# Patient Record
Sex: MALE | Race: WHITE | NOT HISPANIC OR LATINO | Employment: OTHER | URBAN - METROPOLITAN AREA
[De-identification: names, ages, dates, MRNs, and addresses within clinical notes are randomized per-mention and may not be internally consistent; named-entity substitution may affect disease eponyms.]

---

## 2017-01-09 ENCOUNTER — OFFICE VISIT (OUTPATIENT)
Dept: WOUND CARE | Facility: HOSPITAL | Age: 62
End: 2017-01-09
Attending: SURGERY
Payer: OTHER MISCELLANEOUS

## 2017-01-09 VITALS
RESPIRATION RATE: 20 BRPM | SYSTOLIC BLOOD PRESSURE: 92 MMHG | HEART RATE: 55 BPM | TEMPERATURE: 96 F | DIASTOLIC BLOOD PRESSURE: 61 MMHG

## 2017-01-09 DIAGNOSIS — L89.312 STAGE II PRESSURE ULCER OF RIGHT BUTTOCK: ICD-10-CM

## 2017-01-09 DIAGNOSIS — L89.324 DECUBITUS ULCER OF LEFT ISCHIUM, STAGE 4: Primary | ICD-10-CM

## 2017-01-09 PROCEDURE — 27201912 HC WOUND CARE DEBRIDEMENT SUPPLIES

## 2017-01-09 PROCEDURE — 97597 DBRDMT OPN WND 1ST 20 CM/<: CPT

## 2017-01-09 PROCEDURE — 11042 DBRDMT SUBQ TIS 1ST 20SQCM/<: CPT

## 2017-01-16 ENCOUNTER — OFFICE VISIT (OUTPATIENT)
Dept: WOUND CARE | Facility: HOSPITAL | Age: 62
End: 2017-01-16
Attending: SURGERY
Payer: OTHER MISCELLANEOUS

## 2017-01-16 VITALS
TEMPERATURE: 97 F | HEART RATE: 62 BPM | RESPIRATION RATE: 18 BRPM | SYSTOLIC BLOOD PRESSURE: 115 MMHG | DIASTOLIC BLOOD PRESSURE: 86 MMHG

## 2017-01-16 DIAGNOSIS — F17.210 CIGARETTE SMOKER: ICD-10-CM

## 2017-01-16 DIAGNOSIS — L89.312 STAGE II PRESSURE ULCER OF RIGHT BUTTOCK: ICD-10-CM

## 2017-01-16 DIAGNOSIS — L89.324 DECUBITUS ULCER OF LEFT ISCHIUM, STAGE 4: Primary | ICD-10-CM

## 2017-01-16 DIAGNOSIS — G82.20 PARAPLEGIA: ICD-10-CM

## 2017-01-16 DIAGNOSIS — Z86.718 HISTORY OF DEEP VENOUS THROMBOSIS: ICD-10-CM

## 2017-01-16 PROCEDURE — 11042 DBRDMT SUBQ TIS 1ST 20SQCM/<: CPT

## 2017-01-16 PROCEDURE — 27201912 HC WOUND CARE DEBRIDEMENT SUPPLIES

## 2017-01-16 NOTE — PROGRESS NOTES
The documentation for this encounter was completed in [employer].Please see the scanned in documents for the details.    Manny Vann MD

## 2017-01-23 ENCOUNTER — OFFICE VISIT (OUTPATIENT)
Dept: WOUND CARE | Facility: HOSPITAL | Age: 62
End: 2017-01-23
Attending: SURGERY
Payer: OTHER MISCELLANEOUS

## 2017-01-23 VITALS
TEMPERATURE: 98 F | SYSTOLIC BLOOD PRESSURE: 113 MMHG | DIASTOLIC BLOOD PRESSURE: 51 MMHG | HEART RATE: 55 BPM | RESPIRATION RATE: 20 BRPM

## 2017-01-23 DIAGNOSIS — G82.20 PARAPLEGIA: ICD-10-CM

## 2017-01-23 DIAGNOSIS — L89.324 DECUBITUS ULCER OF LEFT ISCHIUM, STAGE 4: Primary | ICD-10-CM

## 2017-01-23 DIAGNOSIS — F17.210 CIGARETTE SMOKER: ICD-10-CM

## 2017-01-23 DIAGNOSIS — L89.312 STAGE II PRESSURE ULCER OF RIGHT BUTTOCK: ICD-10-CM

## 2017-01-23 PROCEDURE — 27201912 HC WOUND CARE DEBRIDEMENT SUPPLIES

## 2017-01-23 PROCEDURE — 11042 DBRDMT SUBQ TIS 1ST 20SQCM/<: CPT

## 2017-01-23 PROCEDURE — 97597 DBRDMT OPN WND 1ST 20 CM/<: CPT

## 2017-01-30 ENCOUNTER — OFFICE VISIT (OUTPATIENT)
Dept: WOUND CARE | Facility: HOSPITAL | Age: 62
End: 2017-01-30
Attending: SURGERY
Payer: OTHER MISCELLANEOUS

## 2017-01-30 VITALS
TEMPERATURE: 97 F | DIASTOLIC BLOOD PRESSURE: 64 MMHG | SYSTOLIC BLOOD PRESSURE: 105 MMHG | RESPIRATION RATE: 20 BRPM | HEART RATE: 57 BPM

## 2017-01-30 DIAGNOSIS — Z86.718 HISTORY OF DEEP VENOUS THROMBOSIS: ICD-10-CM

## 2017-01-30 DIAGNOSIS — F17.210 CIGARETTE SMOKER: ICD-10-CM

## 2017-01-30 DIAGNOSIS — L89.312 STAGE II PRESSURE ULCER OF RIGHT BUTTOCK: ICD-10-CM

## 2017-01-30 DIAGNOSIS — L89.324 DECUBITUS ULCER OF LEFT ISCHIUM, STAGE 4: Primary | ICD-10-CM

## 2017-01-30 DIAGNOSIS — G82.20 PARAPLEGIA: ICD-10-CM

## 2017-01-30 PROCEDURE — 97597 DBRDMT OPN WND 1ST 20 CM/<: CPT

## 2017-01-30 PROCEDURE — 11042 DBRDMT SUBQ TIS 1ST 20SQCM/<: CPT

## 2017-01-30 PROCEDURE — 27201912 HC WOUND CARE DEBRIDEMENT SUPPLIES

## 2017-02-06 ENCOUNTER — OFFICE VISIT (OUTPATIENT)
Dept: WOUND CARE | Facility: HOSPITAL | Age: 62
End: 2017-02-06
Attending: SURGERY
Payer: OTHER MISCELLANEOUS

## 2017-02-06 VITALS
SYSTOLIC BLOOD PRESSURE: 139 MMHG | HEART RATE: 58 BPM | DIASTOLIC BLOOD PRESSURE: 68 MMHG | RESPIRATION RATE: 20 BRPM | TEMPERATURE: 97 F

## 2017-02-06 DIAGNOSIS — L89.312 STAGE II PRESSURE ULCER OF RIGHT BUTTOCK: ICD-10-CM

## 2017-02-06 DIAGNOSIS — G82.20 PARAPLEGIA: ICD-10-CM

## 2017-02-06 DIAGNOSIS — F17.210 CIGARETTE SMOKER: ICD-10-CM

## 2017-02-06 DIAGNOSIS — L89.324 DECUBITUS ULCER OF LEFT ISCHIUM, STAGE 4: Primary | ICD-10-CM

## 2017-02-06 PROCEDURE — 27201912 HC WOUND CARE DEBRIDEMENT SUPPLIES

## 2017-02-06 PROCEDURE — 11042 DBRDMT SUBQ TIS 1ST 20SQCM/<: CPT

## 2017-02-06 PROCEDURE — 97597 DBRDMT OPN WND 1ST 20 CM/<: CPT

## 2017-02-13 ENCOUNTER — OFFICE VISIT (OUTPATIENT)
Dept: WOUND CARE | Facility: HOSPITAL | Age: 62
End: 2017-02-13
Attending: SURGERY
Payer: OTHER MISCELLANEOUS

## 2017-02-13 VITALS
DIASTOLIC BLOOD PRESSURE: 70 MMHG | RESPIRATION RATE: 20 BRPM | SYSTOLIC BLOOD PRESSURE: 153 MMHG | HEART RATE: 60 BPM | TEMPERATURE: 98 F

## 2017-02-13 DIAGNOSIS — G82.20 PARAPLEGIA: ICD-10-CM

## 2017-02-13 DIAGNOSIS — L89.312 STAGE II PRESSURE ULCER OF RIGHT BUTTOCK: ICD-10-CM

## 2017-02-13 DIAGNOSIS — F17.210 CIGARETTE SMOKER: ICD-10-CM

## 2017-02-13 DIAGNOSIS — L89.324 DECUBITUS ULCER OF LEFT ISCHIUM, STAGE 4: Primary | ICD-10-CM

## 2017-02-13 DIAGNOSIS — L97.312 ULCER OF RIGHT ANKLE, WITH FAT LAYER EXPOSED: ICD-10-CM

## 2017-02-13 DIAGNOSIS — Z86.718 HISTORY OF DEEP VENOUS THROMBOSIS: ICD-10-CM

## 2017-02-13 PROCEDURE — 97597 DBRDMT OPN WND 1ST 20 CM/<: CPT

## 2017-02-13 PROCEDURE — 27201912 HC WOUND CARE DEBRIDEMENT SUPPLIES

## 2017-02-13 PROCEDURE — 11042 DBRDMT SUBQ TIS 1ST 20SQCM/<: CPT

## 2017-02-20 ENCOUNTER — OFFICE VISIT (OUTPATIENT)
Dept: WOUND CARE | Facility: HOSPITAL | Age: 62
End: 2017-02-20
Attending: SURGERY
Payer: OTHER MISCELLANEOUS

## 2017-02-20 ENCOUNTER — CLINICAL SUPPORT (OUTPATIENT)
Dept: SMOKING CESSATION | Facility: CLINIC | Age: 62
End: 2017-02-20
Payer: COMMERCIAL

## 2017-02-20 VITALS
TEMPERATURE: 98 F | DIASTOLIC BLOOD PRESSURE: 62 MMHG | RESPIRATION RATE: 20 BRPM | HEART RATE: 73 BPM | SYSTOLIC BLOOD PRESSURE: 89 MMHG

## 2017-02-20 DIAGNOSIS — L89.513 STAGE III PRESSURE ULCER OF RIGHT ANKLE: ICD-10-CM

## 2017-02-20 DIAGNOSIS — L89.324 DECUBITUS ULCER OF LEFT ISCHIUM, STAGE 4: Primary | ICD-10-CM

## 2017-02-20 DIAGNOSIS — F17.210 CIGARETTE SMOKER: ICD-10-CM

## 2017-02-20 DIAGNOSIS — F17.200 NICOTINE DEPENDENCE: Primary | ICD-10-CM

## 2017-02-20 DIAGNOSIS — L89.312 STAGE II PRESSURE ULCER OF RIGHT BUTTOCK: ICD-10-CM

## 2017-02-20 PROCEDURE — 97597 DBRDMT OPN WND 1ST 20 CM/<: CPT

## 2017-02-20 PROCEDURE — 99404 PREV MED CNSL INDIV APPRX 60: CPT | Mod: S$GLB,,,

## 2017-02-20 PROCEDURE — 27201912 HC WOUND CARE DEBRIDEMENT SUPPLIES

## 2017-02-20 PROCEDURE — 11042 DBRDMT SUBQ TIS 1ST 20SQCM/<: CPT

## 2017-02-20 RX ORDER — VARENICLINE TARTRATE 0.5 (11)-1
KIT ORAL
Qty: 1 PACKAGE | Refills: 0 | Status: SHIPPED | OUTPATIENT
Start: 2017-02-20 | End: 2017-03-20

## 2017-02-20 RX ORDER — IBUPROFEN 200 MG
1 TABLET ORAL DAILY
Qty: 14 PATCH | Refills: 0 | Status: SHIPPED | OUTPATIENT
Start: 2017-02-20 | End: 2017-03-06

## 2017-02-20 NOTE — LETTER
February 20, 2017      Manny Vann MD  [address] [facility] [address]           [facility] [address] [place] [address]  Phone: [phone]  Fax: [phone]          Patient: Kasia Vizcaino   MR Number: [id number]   YOB: 1955   Date of Visit: 2/20/2017       Dear Dr. Manny Vann:    Thank you for referring Kasia Vizcaino to me for evaluation. Attached you will find relevant portions of my assessment and plan of care.    If you have questions, please do not hesitate to call me. I look forward to following Kasia Vizcaino along with you.    Sincerely,    Leticia Marquez RN    Enclosure  CC:  No Recipients    If you would like to receive this communication electronically, please contact [email or web address] or [phone] to request more information on [employer] Link access.    For providers and/or their staff who would like to refer a patient to [facility], please contact us through our one-stop-shop provider referral line, [facility], at [phone].    If you feel you have received this communication in error or would no longer like to receive these types of communications, please e-mail [email or web address]

## 2017-03-06 ENCOUNTER — OFFICE VISIT (OUTPATIENT)
Dept: WOUND CARE | Facility: HOSPITAL | Age: 62
End: 2017-03-06
Attending: SURGERY
Payer: OTHER MISCELLANEOUS

## 2017-03-06 ENCOUNTER — TELEPHONE (OUTPATIENT)
Dept: SMOKING CESSATION | Facility: CLINIC | Age: 62
End: 2017-03-06

## 2017-03-06 VITALS
DIASTOLIC BLOOD PRESSURE: 67 MMHG | HEART RATE: 64 BPM | RESPIRATION RATE: 20 BRPM | SYSTOLIC BLOOD PRESSURE: 115 MMHG | TEMPERATURE: 98 F

## 2017-03-06 DIAGNOSIS — L89.513 STAGE III PRESSURE ULCER OF RIGHT ANKLE: ICD-10-CM

## 2017-03-06 DIAGNOSIS — L89.312 STAGE II PRESSURE ULCER OF RIGHT BUTTOCK: ICD-10-CM

## 2017-03-06 DIAGNOSIS — L97.312 ULCER OF RIGHT ANKLE, WITH FAT LAYER EXPOSED: ICD-10-CM

## 2017-03-06 DIAGNOSIS — L89.324 DECUBITUS ULCER OF LEFT ISCHIUM, STAGE 4: Primary | ICD-10-CM

## 2017-03-06 DIAGNOSIS — F17.210 CIGARETTE SMOKER: ICD-10-CM

## 2017-03-06 PROCEDURE — 27201912 HC WOUND CARE DEBRIDEMENT SUPPLIES

## 2017-03-06 PROCEDURE — 97597 DBRDMT OPN WND 1ST 20 CM/<: CPT

## 2017-03-06 PROCEDURE — 11042 DBRDMT SUBQ TIS 1ST 20SQCM/<: CPT

## 2017-03-09 ENCOUNTER — TELEPHONE (OUTPATIENT)
Dept: SMOKING CESSATION | Facility: CLINIC | Age: 62
End: 2017-03-09

## 2017-03-13 ENCOUNTER — OFFICE VISIT (OUTPATIENT)
Dept: WOUND CARE | Facility: HOSPITAL | Age: 62
End: 2017-03-13
Attending: SURGERY
Payer: OTHER MISCELLANEOUS

## 2017-03-13 DIAGNOSIS — L89.324 DECUBITUS ULCER OF LEFT ISCHIUM, STAGE 4: Primary | ICD-10-CM

## 2017-03-13 DIAGNOSIS — L89.513 STAGE III PRESSURE ULCER OF RIGHT ANKLE: ICD-10-CM

## 2017-03-13 DIAGNOSIS — G82.20 PARAPLEGIA: ICD-10-CM

## 2017-03-13 DIAGNOSIS — F17.210 CIGARETTE SMOKER: ICD-10-CM

## 2017-03-13 DIAGNOSIS — L89.312 STAGE II PRESSURE ULCER OF RIGHT BUTTOCK: ICD-10-CM

## 2017-03-13 PROCEDURE — 97597 DBRDMT OPN WND 1ST 20 CM/<: CPT

## 2017-03-13 PROCEDURE — 11042 DBRDMT SUBQ TIS 1ST 20SQCM/<: CPT

## 2017-03-13 PROCEDURE — 27201912 HC WOUND CARE DEBRIDEMENT SUPPLIES

## 2017-03-14 ENCOUNTER — TELEPHONE (OUTPATIENT)
Dept: SMOKING CESSATION | Facility: CLINIC | Age: 62
End: 2017-03-14

## 2017-03-20 ENCOUNTER — OFFICE VISIT (OUTPATIENT)
Dept: WOUND CARE | Facility: HOSPITAL | Age: 62
End: 2017-03-20
Attending: SURGERY
Payer: COMMERCIAL

## 2017-03-20 VITALS — DIASTOLIC BLOOD PRESSURE: 79 MMHG | SYSTOLIC BLOOD PRESSURE: 167 MMHG | HEART RATE: 68 BPM | RESPIRATION RATE: 20 BRPM

## 2017-03-20 DIAGNOSIS — L97.312 ULCER OF RIGHT ANKLE, WITH FAT LAYER EXPOSED: ICD-10-CM

## 2017-03-20 DIAGNOSIS — L89.313 STAGE III PRESSURE ULCER OF RIGHT BUTTOCK: ICD-10-CM

## 2017-03-20 DIAGNOSIS — L89.513 STAGE III PRESSURE ULCER OF RIGHT ANKLE: ICD-10-CM

## 2017-03-20 DIAGNOSIS — G82.20 PARAPLEGIA: ICD-10-CM

## 2017-03-20 DIAGNOSIS — L89.324 DECUBITUS ULCER OF LEFT ISCHIUM, STAGE 4: Primary | ICD-10-CM

## 2017-03-20 DIAGNOSIS — F17.210 CIGARETTE SMOKER: ICD-10-CM

## 2017-03-20 PROCEDURE — 11042 DBRDMT SUBQ TIS 1ST 20SQCM/<: CPT

## 2017-03-20 PROCEDURE — 27201912 HC WOUND CARE DEBRIDEMENT SUPPLIES

## 2017-03-21 ENCOUNTER — TELEPHONE (OUTPATIENT)
Dept: SMOKING CESSATION | Facility: CLINIC | Age: 62
End: 2017-03-21

## 2017-03-21 NOTE — TELEPHONE ENCOUNTER
Left message for Mr. Pedroza to call back ([phone]) for phone follow up/medication review and to see if he was still interested in participating in the Smoking Cessation Program.

## 2017-03-22 ENCOUNTER — CLINICAL SUPPORT (OUTPATIENT)
Dept: SMOKING CESSATION | Facility: CLINIC | Age: 62
End: 2017-03-22
Payer: COMMERCIAL

## 2017-03-22 DIAGNOSIS — F17.200 NICOTINE DEPENDENCE: Primary | ICD-10-CM

## 2017-03-22 PROCEDURE — 99403 PREV MED CNSL INDIV APPRX 45: CPT | Mod: S$GLB,,,

## 2017-03-22 RX ORDER — VARENICLINE TARTRATE 1 MG/1
1 TABLET, FILM COATED ORAL 2 TIMES DAILY
Qty: 62 TABLET | Refills: 0 | Status: SHIPPED | OUTPATIENT
Start: 2017-03-22 | End: 2017-04-22

## 2017-03-22 NOTE — PROGRESS NOTES
Individual Follow-Up Form    3/22/2017    Planned Quit Date: 4/1/2017    Clinical Status of Patient: Outpatient    Length of Service: 45 minutes    Continuing Medication: yes  Chantix       Target Symptoms: Withdrawal and medication side effects. The following were  rated moderate (3) to severe (4) on TCRS:  · Moderate (3):  None   · Severe (4):  None     Comments:  Patient is smoking 6 cigarettes per day and remains on the prescribed tobacco cessation medication regimen of 1 mg Chantix BID without any negative side effects at this time. Orientation, completion of TCRS (Tobacco Cessation Rating Scale) learned addiction model, cues/triggers, personal reasons for quitting, medications, goals, quit date. Patient has a planned quit date of 4/1/2017.    Diagnosis: F17.200    Next Visit: 1 week

## 2017-03-27 ENCOUNTER — OFFICE VISIT (OUTPATIENT)
Dept: WOUND CARE | Facility: HOSPITAL | Age: 62
End: 2017-03-27
Attending: SURGERY
Payer: OTHER MISCELLANEOUS

## 2017-03-27 VITALS — RESPIRATION RATE: 20 BRPM | DIASTOLIC BLOOD PRESSURE: 80 MMHG | SYSTOLIC BLOOD PRESSURE: 165 MMHG | HEART RATE: 66 BPM

## 2017-03-27 DIAGNOSIS — L89.324 DECUBITUS ULCER OF LEFT ISCHIUM, STAGE 4: Primary | ICD-10-CM

## 2017-03-27 DIAGNOSIS — L89.513 STAGE III PRESSURE ULCER OF RIGHT ANKLE: ICD-10-CM

## 2017-03-27 DIAGNOSIS — F17.210 CIGARETTE SMOKER: ICD-10-CM

## 2017-03-27 DIAGNOSIS — G82.20 PARAPLEGIA: ICD-10-CM

## 2017-03-27 DIAGNOSIS — L89.313 STAGE III PRESSURE ULCER OF RIGHT BUTTOCK: ICD-10-CM

## 2017-03-27 PROCEDURE — 11042 DBRDMT SUBQ TIS 1ST 20SQCM/<: CPT

## 2017-03-27 PROCEDURE — 27201912 HC WOUND CARE DEBRIDEMENT SUPPLIES

## 2017-04-03 ENCOUNTER — OFFICE VISIT (OUTPATIENT)
Dept: WOUND CARE | Facility: HOSPITAL | Age: 62
End: 2017-04-03
Attending: SURGERY
Payer: OTHER MISCELLANEOUS

## 2017-04-03 VITALS
DIASTOLIC BLOOD PRESSURE: 60 MMHG | RESPIRATION RATE: 20 BRPM | SYSTOLIC BLOOD PRESSURE: 91 MMHG | HEART RATE: 74 BPM | TEMPERATURE: 97 F

## 2017-04-03 DIAGNOSIS — L89.513 STAGE III PRESSURE ULCER OF RIGHT ANKLE: ICD-10-CM

## 2017-04-03 DIAGNOSIS — F17.210 CIGARETTE SMOKER: ICD-10-CM

## 2017-04-03 DIAGNOSIS — L97.312 ULCER OF RIGHT ANKLE, WITH FAT LAYER EXPOSED: ICD-10-CM

## 2017-04-03 DIAGNOSIS — L89.324 DECUBITUS ULCER OF LEFT ISCHIUM, STAGE 4: Primary | ICD-10-CM

## 2017-04-03 DIAGNOSIS — G82.20 PARAPLEGIA: ICD-10-CM

## 2017-04-03 PROCEDURE — 27201912 HC WOUND CARE DEBRIDEMENT SUPPLIES

## 2017-04-03 PROCEDURE — 11042 DBRDMT SUBQ TIS 1ST 20SQCM/<: CPT

## 2017-04-10 ENCOUNTER — OFFICE VISIT (OUTPATIENT)
Dept: WOUND CARE | Facility: HOSPITAL | Age: 62
End: 2017-04-10
Attending: SURGERY
Payer: COMMERCIAL

## 2017-04-10 ENCOUNTER — TELEPHONE (OUTPATIENT)
Dept: SMOKING CESSATION | Facility: CLINIC | Age: 62
End: 2017-04-10

## 2017-04-10 VITALS — DIASTOLIC BLOOD PRESSURE: 64 MMHG | RESPIRATION RATE: 18 BRPM | HEART RATE: 67 BPM | SYSTOLIC BLOOD PRESSURE: 127 MMHG

## 2017-04-10 DIAGNOSIS — L89.313 STAGE III PRESSURE ULCER OF RIGHT BUTTOCK: ICD-10-CM

## 2017-04-10 DIAGNOSIS — L89.513 STAGE III PRESSURE ULCER OF RIGHT ANKLE: ICD-10-CM

## 2017-04-10 DIAGNOSIS — L89.324 DECUBITUS ULCER OF LEFT ISCHIUM, STAGE 4: Primary | ICD-10-CM

## 2017-04-10 DIAGNOSIS — F17.210 CIGARETTE SMOKER: ICD-10-CM

## 2017-04-10 DIAGNOSIS — G82.20 PARAPLEGIA: ICD-10-CM

## 2017-04-10 PROCEDURE — 27201912 HC WOUND CARE DEBRIDEMENT SUPPLIES

## 2017-04-10 PROCEDURE — 11042 DBRDMT SUBQ TIS 1ST 20SQCM/<: CPT

## 2017-04-10 NOTE — TELEPHONE ENCOUNTER
Attempted to call Mr. Pedroza for follow up/medication review and to see if he was still interested in participating in Smoking Cessation Program. Called wife (Ms. Liz) as second phone number, she stated that she was on her way home from work and let him know to give me a call back at  [phone].

## 2017-04-17 ENCOUNTER — CLINICAL SUPPORT (OUTPATIENT)
Dept: SMOKING CESSATION | Facility: CLINIC | Age: 62
End: 2017-04-17
Payer: COMMERCIAL

## 2017-04-17 DIAGNOSIS — F17.200 NICOTINE DEPENDENCE: Primary | ICD-10-CM

## 2017-04-17 PROCEDURE — 99407 BEHAV CHNG SMOKING > 10 MIN: CPT | Mod: S$GLB,,,

## 2017-04-17 RX ORDER — IBUPROFEN 200 MG
1 TABLET ORAL DAILY
Qty: 14 PATCH | Refills: 0 | Status: SHIPPED | OUTPATIENT
Start: 2017-04-17 | End: 2017-05-01

## 2017-04-24 ENCOUNTER — OFFICE VISIT (OUTPATIENT)
Dept: WOUND CARE | Facility: HOSPITAL | Age: 62
End: 2017-04-24
Attending: SURGERY
Payer: OTHER MISCELLANEOUS

## 2017-04-24 VITALS
SYSTOLIC BLOOD PRESSURE: 89 MMHG | DIASTOLIC BLOOD PRESSURE: 60 MMHG | TEMPERATURE: 98 F | HEART RATE: 68 BPM | RESPIRATION RATE: 18 BRPM

## 2017-04-24 DIAGNOSIS — F17.210 CIGARETTE SMOKER: ICD-10-CM

## 2017-04-24 DIAGNOSIS — G82.20 PARAPLEGIA: ICD-10-CM

## 2017-04-24 DIAGNOSIS — L89.324 DECUBITUS ULCER OF LEFT ISCHIUM, STAGE 4: Primary | ICD-10-CM

## 2017-04-24 DIAGNOSIS — L89.313 STAGE III PRESSURE ULCER OF RIGHT BUTTOCK: ICD-10-CM

## 2017-04-24 PROBLEM — L89.513 STAGE III PRESSURE ULCER OF RIGHT ANKLE: Status: RESOLVED | Noted: 2017-02-20 | Resolved: 2017-04-24

## 2017-04-24 PROCEDURE — 27201912 HC WOUND CARE DEBRIDEMENT SUPPLIES

## 2017-04-24 PROCEDURE — 11042 DBRDMT SUBQ TIS 1ST 20SQCM/<: CPT

## 2017-05-01 ENCOUNTER — OFFICE VISIT (OUTPATIENT)
Dept: WOUND CARE | Facility: HOSPITAL | Age: 62
End: 2017-05-01
Attending: SURGERY
Payer: OTHER MISCELLANEOUS

## 2017-05-01 VITALS — HEART RATE: 59 BPM | DIASTOLIC BLOOD PRESSURE: 63 MMHG | RESPIRATION RATE: 18 BRPM | SYSTOLIC BLOOD PRESSURE: 127 MMHG

## 2017-05-01 DIAGNOSIS — L89.324 DECUBITUS ULCER OF LEFT ISCHIUM, STAGE 4: Primary | ICD-10-CM

## 2017-05-01 DIAGNOSIS — L89.313 STAGE III PRESSURE ULCER OF RIGHT BUTTOCK: ICD-10-CM

## 2017-05-01 DIAGNOSIS — L89.513 PRESSURE ULCER OF RIGHT ANKLE, STAGE 3: ICD-10-CM

## 2017-05-01 DIAGNOSIS — F17.210 CIGARETTE SMOKER: ICD-10-CM

## 2017-05-01 PROCEDURE — 27201912 HC WOUND CARE DEBRIDEMENT SUPPLIES

## 2017-05-01 PROCEDURE — 11042 DBRDMT SUBQ TIS 1ST 20SQCM/<: CPT

## 2017-05-02 ENCOUNTER — CLINICAL SUPPORT (OUTPATIENT)
Dept: SMOKING CESSATION | Facility: CLINIC | Age: 62
End: 2017-05-02
Payer: COMMERCIAL

## 2017-05-02 DIAGNOSIS — F17.200 NICOTINE DEPENDENCE: Primary | ICD-10-CM

## 2017-05-02 PROCEDURE — 99403 PREV MED CNSL INDIV APPRX 45: CPT | Mod: S$GLB,,,

## 2017-05-02 NOTE — PROGRESS NOTES
Individual Follow-Up Form    5/2/2017      Clinical Status of Patient: Outpatient    Length of Service: 45 minutes    Continuing Medication: yes  Patches       Target Symptoms: Withdrawal and medication side effects. The following were rated moderate (3) to severe (4) on TCRS:  · Moderate (3):  None   · Severe (4):  None     Comments:  Patient is smoking 10 cigarettes per day and will start using the prescribed tobacco cessation medication regimen of 21 mg nicotine patches tomorrow 5/3/2017. Patient stated that he wanted to finish taking the Chantix before he switched to the nicotine patches. Completion of TCRS (Tobacco Cessation Rating Scale) reviewed strategies, controlling environment, cues, triggers, new goals set. Introduced high risk situations with preparation interventions, caffeine similarities with withdrawal issues of habit and nicotine, Alcohol, Understanding urges, cravings, stress and relaxation. Open discussion with intervention discussion. Patient's goal for his next visit scheduled on 5/16/2017 is to cut down by half (5 cigarettes per day).         Diagnosis: F17.200    Next Visit: 2 weeks

## 2017-05-22 ENCOUNTER — OFFICE VISIT (OUTPATIENT)
Dept: WOUND CARE | Facility: HOSPITAL | Age: 62
End: 2017-05-22
Attending: SURGERY
Payer: OTHER MISCELLANEOUS

## 2017-05-22 VITALS — HEART RATE: 62 BPM | RESPIRATION RATE: 18 BRPM | DIASTOLIC BLOOD PRESSURE: 73 MMHG | SYSTOLIC BLOOD PRESSURE: 138 MMHG

## 2017-05-22 DIAGNOSIS — L89.324 DECUBITUS ULCER OF LEFT ISCHIUM, STAGE 4: Primary | ICD-10-CM

## 2017-05-22 DIAGNOSIS — F17.210 CIGARETTE SMOKER: ICD-10-CM

## 2017-05-22 DIAGNOSIS — Z86.718 HISTORY OF DEEP VENOUS THROMBOSIS: ICD-10-CM

## 2017-05-22 DIAGNOSIS — G82.20 PARAPLEGIA: ICD-10-CM

## 2017-05-22 DIAGNOSIS — L89.313 STAGE III PRESSURE ULCER OF RIGHT BUTTOCK: ICD-10-CM

## 2017-05-22 PROCEDURE — 11042 DBRDMT SUBQ TIS 1ST 20SQCM/<: CPT

## 2017-05-22 PROCEDURE — 27201912 HC WOUND CARE DEBRIDEMENT SUPPLIES

## 2017-06-12 ENCOUNTER — OFFICE VISIT (OUTPATIENT)
Dept: WOUND CARE | Facility: HOSPITAL | Age: 62
End: 2017-06-12
Attending: SURGERY
Payer: OTHER MISCELLANEOUS

## 2017-06-12 VITALS
RESPIRATION RATE: 18 BRPM | DIASTOLIC BLOOD PRESSURE: 55 MMHG | HEART RATE: 72 BPM | SYSTOLIC BLOOD PRESSURE: 104 MMHG | TEMPERATURE: 98 F

## 2017-06-12 DIAGNOSIS — L89.324 DECUBITUS ULCER OF LEFT ISCHIUM, STAGE 4: Primary | ICD-10-CM

## 2017-06-12 DIAGNOSIS — G82.20 PARAPLEGIA: ICD-10-CM

## 2017-06-12 DIAGNOSIS — L89.313 STAGE III PRESSURE ULCER OF RIGHT BUTTOCK: ICD-10-CM

## 2017-06-12 DIAGNOSIS — F17.210 CIGARETTE SMOKER: ICD-10-CM

## 2017-06-12 PROBLEM — L89.513 PRESSURE ULCER OF RIGHT ANKLE, STAGE 3: Status: RESOLVED | Noted: 2017-05-01 | Resolved: 2017-06-12

## 2017-06-12 PROCEDURE — 27201912 HC WOUND CARE DEBRIDEMENT SUPPLIES

## 2017-06-12 PROCEDURE — 11042 DBRDMT SUBQ TIS 1ST 20SQCM/<: CPT

## 2017-06-26 ENCOUNTER — OFFICE VISIT (OUTPATIENT)
Dept: WOUND CARE | Facility: HOSPITAL | Age: 62
End: 2017-06-26
Attending: SURGERY
Payer: COMMERCIAL

## 2017-06-26 VITALS — HEART RATE: 63 BPM | DIASTOLIC BLOOD PRESSURE: 67 MMHG | SYSTOLIC BLOOD PRESSURE: 114 MMHG | RESPIRATION RATE: 18 BRPM

## 2017-06-26 DIAGNOSIS — L89.324 DECUBITUS ULCER OF LEFT ISCHIUM, STAGE 4: Primary | ICD-10-CM

## 2017-06-26 DIAGNOSIS — L89.313 STAGE III PRESSURE ULCER OF RIGHT BUTTOCK: ICD-10-CM

## 2017-06-26 DIAGNOSIS — Z86.718 HISTORY OF DEEP VENOUS THROMBOSIS: ICD-10-CM

## 2017-06-26 DIAGNOSIS — G82.20 PARAPLEGIA: ICD-10-CM

## 2017-06-26 PROCEDURE — 27201912 HC WOUND CARE DEBRIDEMENT SUPPLIES

## 2017-06-26 PROCEDURE — 11042 DBRDMT SUBQ TIS 1ST 20SQCM/<: CPT

## 2017-07-10 ENCOUNTER — OFFICE VISIT (OUTPATIENT)
Dept: WOUND CARE | Facility: HOSPITAL | Age: 62
End: 2017-07-10
Attending: SURGERY
Payer: OTHER MISCELLANEOUS

## 2017-07-10 VITALS — SYSTOLIC BLOOD PRESSURE: 170 MMHG | RESPIRATION RATE: 20 BRPM | HEART RATE: 61 BPM | DIASTOLIC BLOOD PRESSURE: 79 MMHG

## 2017-07-10 DIAGNOSIS — L89.324 DECUBITUS ULCER OF LEFT ISCHIUM, STAGE 4: Primary | ICD-10-CM

## 2017-07-10 DIAGNOSIS — G82.20 PARAPLEGIA: ICD-10-CM

## 2017-07-10 DIAGNOSIS — F17.210 CIGARETTE SMOKER: ICD-10-CM

## 2017-07-10 DIAGNOSIS — L89.313 STAGE III PRESSURE ULCER OF RIGHT BUTTOCK: ICD-10-CM

## 2017-07-10 PROCEDURE — 11042 DBRDMT SUBQ TIS 1ST 20SQCM/<: CPT

## 2017-07-10 PROCEDURE — 27201912 HC WOUND CARE DEBRIDEMENT SUPPLIES

## 2017-07-24 ENCOUNTER — OFFICE VISIT (OUTPATIENT)
Dept: WOUND CARE | Facility: HOSPITAL | Age: 62
End: 2017-07-24
Attending: SURGERY
Payer: OTHER MISCELLANEOUS

## 2017-07-24 VITALS
RESPIRATION RATE: 20 BRPM | SYSTOLIC BLOOD PRESSURE: 95 MMHG | DIASTOLIC BLOOD PRESSURE: 54 MMHG | TEMPERATURE: 98 F | HEART RATE: 70 BPM

## 2017-07-24 DIAGNOSIS — L89.324 DECUBITUS ULCER OF LEFT ISCHIUM, STAGE 4: Primary | ICD-10-CM

## 2017-07-24 DIAGNOSIS — F17.210 CIGARETTE SMOKER: ICD-10-CM

## 2017-07-24 DIAGNOSIS — L89.313 STAGE III PRESSURE ULCER OF RIGHT BUTTOCK: ICD-10-CM

## 2017-07-24 DIAGNOSIS — G82.20 PARAPLEGIA: ICD-10-CM

## 2017-07-24 PROCEDURE — 27201912 HC WOUND CARE DEBRIDEMENT SUPPLIES

## 2017-07-24 PROCEDURE — 11042 DBRDMT SUBQ TIS 1ST 20SQCM/<: CPT

## 2017-08-07 ENCOUNTER — OFFICE VISIT (OUTPATIENT)
Dept: WOUND CARE | Facility: HOSPITAL | Age: 62
End: 2017-08-07
Attending: SURGERY
Payer: OTHER MISCELLANEOUS

## 2017-08-07 VITALS — SYSTOLIC BLOOD PRESSURE: 139 MMHG | DIASTOLIC BLOOD PRESSURE: 70 MMHG | RESPIRATION RATE: 20 BRPM | HEART RATE: 66 BPM

## 2017-08-07 DIAGNOSIS — F17.210 CIGARETTE SMOKER: ICD-10-CM

## 2017-08-07 DIAGNOSIS — L89.324 DECUBITUS ULCER OF LEFT ISCHIUM, STAGE 4: Primary | ICD-10-CM

## 2017-08-07 DIAGNOSIS — G82.20 PARAPLEGIA: ICD-10-CM

## 2017-08-07 PROCEDURE — 27201912 HC WOUND CARE DEBRIDEMENT SUPPLIES

## 2017-08-07 PROCEDURE — 11042 DBRDMT SUBQ TIS 1ST 20SQCM/<: CPT

## 2017-08-21 ENCOUNTER — OFFICE VISIT (OUTPATIENT)
Dept: WOUND CARE | Facility: HOSPITAL | Age: 62
End: 2017-08-21
Attending: SURGERY
Payer: COMMERCIAL

## 2017-08-21 VITALS — SYSTOLIC BLOOD PRESSURE: 141 MMHG | RESPIRATION RATE: 20 BRPM | HEART RATE: 67 BPM | DIASTOLIC BLOOD PRESSURE: 67 MMHG

## 2017-08-21 DIAGNOSIS — L89.324 DECUBITUS ULCER OF LEFT ISCHIUM, STAGE 4: Primary | ICD-10-CM

## 2017-08-21 DIAGNOSIS — G82.20 PARAPLEGIA: ICD-10-CM

## 2017-08-21 DIAGNOSIS — F17.210 CIGARETTE SMOKER: ICD-10-CM

## 2017-08-21 PROCEDURE — 27201912 HC WOUND CARE DEBRIDEMENT SUPPLIES

## 2017-08-21 PROCEDURE — 87077 CULTURE AEROBIC IDENTIFY: CPT

## 2017-08-21 PROCEDURE — 87205 SMEAR GRAM STAIN: CPT

## 2017-08-21 PROCEDURE — 87186 SC STD MICRODIL/AGAR DIL: CPT

## 2017-08-21 PROCEDURE — 11042 DBRDMT SUBQ TIS 1ST 20SQCM/<: CPT

## 2017-08-21 PROCEDURE — 87070 CULTURE OTHR SPECIMN AEROBIC: CPT

## 2017-08-25 LAB
BACTERIA THROAT CULT: NORMAL
GRAM STN SPEC: NORMAL
GRAM STN SPEC: NORMAL

## 2017-09-10 NOTE — PROGRESS NOTES
Individual Follow-Up Form    4/17/2017    Planned Quit Date: 5/15/2017    Clinical Status of Patient: Outpatient    Length of Service: 30 minutes    Continuing Medication: yes  Chantix       Target Symptoms: Withdrawal and medication side effects. The following were rated moderate (3) to severe (4) on TCRS:  · Moderate (3):  Desire or crave tobacco; discussed with patient this is a normal withdrawal symptom.  · Severe (4):  None     Comments:  Patient is back to smoking 10 cigarettes per day and remains on the prescribed tobacco cessation medication regimen of 1 mg Chantix BID without any negative side effects at this time. Patient stated that he feels the Chantix is no longer working for him. He was doing really well with it in the beginning, but is now smoking more and more. Suggested that the patient go back to the 21 mg nicotine patches and he is in agreement with this. This prescription will be ordered today for him. Completion of TCRS (Tobacco Cessation Rating Scale) reviewed strategies, cues, and triggers. Introduced the negative impact of tobacco on health, the health advantages of discontinuing the use of tobacco, time line improved health changes after a quit, withdrawal issues to expect from nicotine and habit, and ways to achieve the goal of a quit.      Diagnosis: F17.200    Next Visit: 2 weeks   No

## 2017-09-11 ENCOUNTER — OFFICE VISIT (OUTPATIENT)
Dept: WOUND CARE | Facility: HOSPITAL | Age: 62
End: 2017-09-11
Attending: SURGERY
Payer: OTHER MISCELLANEOUS

## 2017-09-11 VITALS
RESPIRATION RATE: 18 BRPM | SYSTOLIC BLOOD PRESSURE: 93 MMHG | TEMPERATURE: 97 F | HEART RATE: 68 BPM | DIASTOLIC BLOOD PRESSURE: 56 MMHG

## 2017-09-11 DIAGNOSIS — F17.210 CIGARETTE SMOKER: ICD-10-CM

## 2017-09-11 DIAGNOSIS — G82.20 PARAPLEGIA: ICD-10-CM

## 2017-09-11 DIAGNOSIS — L89.324 DECUBITUS ULCER OF LEFT ISCHIUM, STAGE 4: Primary | ICD-10-CM

## 2017-09-11 PROCEDURE — 11042 DBRDMT SUBQ TIS 1ST 20SQCM/<: CPT

## 2017-09-11 PROCEDURE — 27201912 HC WOUND CARE DEBRIDEMENT SUPPLIES

## 2017-09-25 ENCOUNTER — OFFICE VISIT (OUTPATIENT)
Dept: WOUND CARE | Facility: HOSPITAL | Age: 62
End: 2017-09-25
Attending: SURGERY
Payer: OTHER MISCELLANEOUS

## 2017-09-25 VITALS — SYSTOLIC BLOOD PRESSURE: 126 MMHG | HEART RATE: 65 BPM | RESPIRATION RATE: 18 BRPM | DIASTOLIC BLOOD PRESSURE: 72 MMHG

## 2017-09-25 DIAGNOSIS — L89.324 DECUBITUS ULCER OF LEFT ISCHIUM, STAGE 4: ICD-10-CM

## 2017-09-25 DIAGNOSIS — F17.210 CIGARETTE SMOKER: ICD-10-CM

## 2017-09-25 DIAGNOSIS — G82.20 PARAPLEGIA: Primary | ICD-10-CM

## 2017-09-25 PROCEDURE — 27201912 HC WOUND CARE DEBRIDEMENT SUPPLIES

## 2017-09-25 PROCEDURE — 11042 DBRDMT SUBQ TIS 1ST 20SQCM/<: CPT

## 2017-10-09 ENCOUNTER — OFFICE VISIT (OUTPATIENT)
Dept: WOUND CARE | Facility: HOSPITAL | Age: 62
End: 2017-10-09
Attending: SURGERY
Payer: OTHER MISCELLANEOUS

## 2017-10-09 VITALS
RESPIRATION RATE: 20 BRPM | SYSTOLIC BLOOD PRESSURE: 135 MMHG | HEART RATE: 57 BPM | TEMPERATURE: 98 F | DIASTOLIC BLOOD PRESSURE: 62 MMHG

## 2017-10-09 DIAGNOSIS — L89.324 DECUBITUS ULCER OF LEFT ISCHIUM, STAGE 4: ICD-10-CM

## 2017-10-09 DIAGNOSIS — F17.210 CIGARETTE SMOKER: Primary | ICD-10-CM

## 2017-10-09 DIAGNOSIS — G82.20 PARAPLEGIA: ICD-10-CM

## 2017-10-09 PROCEDURE — 27201912 HC WOUND CARE DEBRIDEMENT SUPPLIES

## 2017-10-09 PROCEDURE — 11042 DBRDMT SUBQ TIS 1ST 20SQCM/<: CPT

## 2017-10-09 NOTE — PROGRESS NOTES
[facility]  Wound Care  Progress Note    Problem List Items Addressed This Visit        Neuro    Paraplegia       Derm    Decubitus ulcer of left ischium, stage 4    Overview     Location: Left ischial region  Duration: Since 11/22/2015  Context: Decubitus ulcer of the left ischial region  Patient developed new wound on the right ischial region noted 1/9/2017. The wound was thought to be related to tubing from the wound vac and pressure. This wound is now healed  Associated Signs and Symptoms: Patient has no pain. He is insensate   Modifying Factors: The patient continues to smoke despite recommendations to stop. He has made no progress.  The wound vac was Utilized until January 9, 2017 on the left ischial region.  The patient sits quite a bit.  He continues to be instructed again to minimize time in his chair to no more than 2 hours at a time 3 times a day.  Patient is using antibiotic compound.            Other    Cigarette smoker - Primary      Other Visit Diagnoses    None.         See wound doc progress notes. Documents will be scanned.        Gilmar Christopher  [facility]  See wound doc progress notes. Documents will be scanned.

## 2017-10-23 ENCOUNTER — OFFICE VISIT (OUTPATIENT)
Dept: WOUND CARE | Facility: HOSPITAL | Age: 62
End: 2017-10-23
Attending: SURGERY
Payer: OTHER MISCELLANEOUS

## 2017-10-23 VITALS
SYSTOLIC BLOOD PRESSURE: 108 MMHG | DIASTOLIC BLOOD PRESSURE: 62 MMHG | RESPIRATION RATE: 20 BRPM | HEART RATE: 74 BPM | TEMPERATURE: 98 F

## 2017-10-23 DIAGNOSIS — L89.324 DECUBITUS ULCER OF LEFT ISCHIUM, STAGE 4: Primary | ICD-10-CM

## 2017-10-23 DIAGNOSIS — G82.20 PARAPLEGIA: ICD-10-CM

## 2017-10-23 DIAGNOSIS — F17.210 CIGARETTE SMOKER: ICD-10-CM

## 2017-10-23 PROCEDURE — 11042 DBRDMT SUBQ TIS 1ST 20SQCM/<: CPT

## 2017-10-23 PROCEDURE — 27201912 HC WOUND CARE DEBRIDEMENT SUPPLIES

## 2017-10-23 NOTE — PROGRESS NOTES
[facility]  Wound Care  Progress Note    Problem List Items Addressed This Visit        Neuro    Paraplegia       Derm    Decubitus ulcer of left ischium, stage 4 - Primary    Overview     Location: Left ischial region  Duration: Since 11/22/2015  Context: Decubitus ulcer of the left ischial region  Patient developed new wound on the right ischial region noted 1/9/2017. The wound was thought to be related to tubing from the wound vac and pressure. This wound is now healed  Associated Signs and Symptoms: Patient has no pain. He is insensate   Modifying Factors: The patient continues to smoke despite recommendations to stop. He has made no progress.  The wound vac was Utilized until January 9, 2017 on the left ischial region.  The patient sits quite a bit.  He continues to be instructed again to minimize time in his chair to no more than 2 hours at a time 3 times a day.  Patient is using antibiotic compound.            Other    Cigarette smoker          See wound doc progress notes. Documents will be scanned.        Gilmar Christopher  [facility]

## 2017-11-06 ENCOUNTER — OFFICE VISIT (OUTPATIENT)
Dept: WOUND CARE | Facility: HOSPITAL | Age: 62
End: 2017-11-06
Attending: SURGERY
Payer: COMMERCIAL

## 2017-11-06 VITALS
SYSTOLIC BLOOD PRESSURE: 137 MMHG | HEART RATE: 63 BPM | TEMPERATURE: 99 F | DIASTOLIC BLOOD PRESSURE: 60 MMHG | RESPIRATION RATE: 20 BRPM

## 2017-11-06 DIAGNOSIS — L89.324 DECUBITUS ULCER OF LEFT ISCHIUM, STAGE 4: Primary | ICD-10-CM

## 2017-11-06 PROCEDURE — 27201912 HC WOUND CARE DEBRIDEMENT SUPPLIES

## 2017-11-06 PROCEDURE — 11042 DBRDMT SUBQ TIS 1ST 20SQCM/<: CPT

## 2017-11-20 ENCOUNTER — OFFICE VISIT (OUTPATIENT)
Dept: WOUND CARE | Facility: HOSPITAL | Age: 62
End: 2017-11-20
Attending: SURGERY
Payer: COMMERCIAL

## 2017-11-20 VITALS
SYSTOLIC BLOOD PRESSURE: 114 MMHG | TEMPERATURE: 98 F | RESPIRATION RATE: 20 BRPM | DIASTOLIC BLOOD PRESSURE: 79 MMHG | HEART RATE: 62 BPM

## 2017-11-20 DIAGNOSIS — G82.20 PARAPLEGIA: ICD-10-CM

## 2017-11-20 DIAGNOSIS — L89.324 DECUBITUS ULCER OF LEFT ISCHIUM, STAGE 4: Primary | ICD-10-CM

## 2017-11-20 PROCEDURE — 27201912 HC WOUND CARE DEBRIDEMENT SUPPLIES

## 2017-11-20 PROCEDURE — 11042 DBRDMT SUBQ TIS 1ST 20SQCM/<: CPT

## 2017-11-20 NOTE — PROGRESS NOTES
[facility]  Wound Care  Progress Note    Problem List Items Addressed This Visit        Neuro    Paraplegia       Derm    Decubitus ulcer of left ischium, stage 4 - Primary    Overview     Location: Left ischial region  Duration: Since 11/22/2015  Context: Decubitus ulcer of the left ischial region  Patient developed new wound on the right ischial region noted 1/9/2017. The wound was thought to be related to tubing from the wound vac and pressure. This wound is now healed  Associated Signs and Symptoms: Patient has no pain. He is insensate   Modifying Factors: The patient continues to smoke despite recommendations to stop. He has made no progress.  The wound vac was Utilized until January 9, 2017 on the left ischial region.  The patient sits quite a bit.  He continues to be instructed again to minimize time in his chair to no more than 2 hours at a time 3 times a day.  Patient is using antibiotic compound.               See wound doc progress notes. Documents will be scanned.        Gilmar Christopher  [facility]

## 2017-12-04 ENCOUNTER — OFFICE VISIT (OUTPATIENT)
Dept: WOUND CARE | Facility: HOSPITAL | Age: 62
End: 2017-12-04
Attending: SURGERY
Payer: COMMERCIAL

## 2017-12-04 VITALS — HEART RATE: 64 BPM | DIASTOLIC BLOOD PRESSURE: 53 MMHG | SYSTOLIC BLOOD PRESSURE: 116 MMHG

## 2017-12-04 DIAGNOSIS — L89.324 DECUBITUS ULCER OF LEFT ISCHIUM, STAGE 4: ICD-10-CM

## 2017-12-04 PROCEDURE — 27201912 HC WOUND CARE DEBRIDEMENT SUPPLIES

## 2017-12-04 PROCEDURE — 11042 DBRDMT SUBQ TIS 1ST 20SQCM/<: CPT

## 2017-12-04 NOTE — PROGRESS NOTES
[facility]  Wound Care  Progress Note    Problem List Items Addressed This Visit     Decubitus ulcer of left ischium, stage 4    Overview     Location: Left ischial region  Duration: Since 11/22/2015  Context: Decubitus ulcer of the left ischial region  Patient developed new wound on the right ischial region noted 1/9/2017. The wound was thought to be related to tubing from the wound vac and pressure. This wound is now healed  Associated Signs and Symptoms: Patient has no pain. He is insensate   Modifying Factors: The patient continues to smoke despite recommendations to stop. He has made no progress.  The wound vac was Utilized until January 9, 2017 on the left ischial region.  The patient sits quite a bit.  He continues to be instructed again to minimize time in his chair to no more than 2 hours at a time 3 times a day.  Patient is using antibiotic compound.  Wound Remained clean.  There is no significant drainage.  There is no exposed bone or Signs of pressure.               See wound doc progress notes. Documents will be scanned.        Manny Vann  [facility]

## 2017-12-18 ENCOUNTER — OFFICE VISIT (OUTPATIENT)
Dept: WOUND CARE | Facility: HOSPITAL | Age: 62
End: 2017-12-18
Attending: SURGERY
Payer: COMMERCIAL

## 2017-12-18 VITALS — HEART RATE: 73 BPM | DIASTOLIC BLOOD PRESSURE: 61 MMHG | RESPIRATION RATE: 20 BRPM | SYSTOLIC BLOOD PRESSURE: 97 MMHG

## 2017-12-18 DIAGNOSIS — L89.324 DECUBITUS ULCER OF LEFT ISCHIUM, STAGE 4: ICD-10-CM

## 2017-12-18 PROCEDURE — 11042 DBRDMT SUBQ TIS 1ST 20SQCM/<: CPT

## 2017-12-18 PROCEDURE — 27201912 HC WOUND CARE DEBRIDEMENT SUPPLIES

## 2017-12-18 NOTE — PROGRESS NOTES
[facility]  Wound Care  Progress Note    Problem List Items Addressed This Visit     Decubitus ulcer of left ischium, stage 4    Overview     Location: Left ischial region  Duration: Since 11/22/2015  Context: Decubitus ulcer of the left ischial region  Patient developed new wound on the right ischial region noted 1/9/2017. The wound was thought to be related to tubing from the wound vac and pressure. This wound is now healed  Associated Signs and Symptoms: Patient has no pain. He is insensate   Modifying Factors: The patient continues to smoke despite recommendations to stop. He has made no progress.  The wound vac was Utilized until January 9, 2017 on the left ischial region.  The patient sits quite a bit.  He continues to smoke.  Patient has been instructed to stop smoking.  He continues to be instructed again to minimize time in his chair to no more than 2 hours at a time 3 times a day.  Patient is using antibiotic compound.  Wound Remained clean.  There is no significant drainage.  There is no exposed bone or Signs of pressure.               See wound doc progress notes. Documents will be scanned.        Manny Vann  [facility]

## 2018-01-15 ENCOUNTER — OFFICE VISIT (OUTPATIENT)
Dept: WOUND CARE | Facility: HOSPITAL | Age: 63
End: 2018-01-15
Attending: SURGERY
Payer: OTHER MISCELLANEOUS

## 2018-01-15 VITALS
RESPIRATION RATE: 20 BRPM | DIASTOLIC BLOOD PRESSURE: 65 MMHG | HEART RATE: 59 BPM | SYSTOLIC BLOOD PRESSURE: 133 MMHG | TEMPERATURE: 98 F

## 2018-01-15 DIAGNOSIS — R60.0 LOCALIZED EDEMA: ICD-10-CM

## 2018-01-15 DIAGNOSIS — F17.210 CIGARETTE SMOKER: ICD-10-CM

## 2018-01-15 DIAGNOSIS — G82.20 PARAPLEGIA: ICD-10-CM

## 2018-01-15 DIAGNOSIS — L89.324 DECUBITUS ULCER OF LEFT ISCHIUM, STAGE 4: Primary | ICD-10-CM

## 2018-01-15 PROCEDURE — 11042 DBRDMT SUBQ TIS 1ST 20SQCM/<: CPT

## 2018-01-15 PROCEDURE — 27201912 HC WOUND CARE DEBRIDEMENT SUPPLIES

## 2018-01-15 NOTE — ASSESSMENT & PLAN NOTE
Wound Remained clean.  There is no significant drainage.  There is no exposed bone or Signs of pressure.  Patient asked to spend time in bed on a schedule during day to offload and elevate legs to resolve edema.

## 2018-01-15 NOTE — PROGRESS NOTES
[facility]  Wound Care  Progress Note    Problem List Items Addressed This Visit        Neuro    Paraplegia       Derm    Decubitus ulcer of left ischium, stage 4 - Primary    Overview     Location: Left ischial region  Duration: Since 11/22/2015  Context: Decubitus ulcer of the left ischial region  Patient developed new wound on the right ischial region noted 1/9/2017. The wound was thought to be related to tubing from the wound vac and pressure. This wound is now healed  Associated Signs and Symptoms: Patient has no pain. He is insensate   Modifying Factors: The patient continues to smoke despite recommendations to stop. He has made no progress.  The wound vac was Utilized until January 9, 2017 on the left ischial region.  The patient sits from 5 am to 8 pm daily in a wheelchair.  He continues to be instructed again to minimize time in his chair.  Patient is using antibiotic compound for 3 months.           Current Assessment & Plan     Wound Remained clean.  There is no significant drainage.  There is no exposed bone or Signs of pressure.  Patient asked to spend time in bed on a schedule during day to offload and elevate legs to resolve edema.            Other    Cigarette smoker    Localized edema    Overview     Bilateral lower extremity               See wound doc progress notes. Documents will be scanned.        Gilmar Christopher  [facility]

## 2018-01-18 ENCOUNTER — TELEPHONE (OUTPATIENT)
Dept: SMOKING CESSATION | Facility: CLINIC | Age: 63
End: 2018-01-18

## 2018-01-19 ENCOUNTER — TELEPHONE (OUTPATIENT)
Dept: SMOKING CESSATION | Facility: CLINIC | Age: 63
End: 2018-01-19

## 2018-01-19 ENCOUNTER — CLINICAL SUPPORT (OUTPATIENT)
Dept: SMOKING CESSATION | Facility: CLINIC | Age: 63
End: 2018-01-19
Payer: COMMERCIAL

## 2018-01-19 DIAGNOSIS — F17.200 NICOTINE DEPENDENCE: Primary | ICD-10-CM

## 2018-01-19 PROCEDURE — 99407 BEHAV CHNG SMOKING > 10 MIN: CPT | Mod: S$GLB,,,

## 2018-01-23 ENCOUNTER — CLINICAL SUPPORT (OUTPATIENT)
Dept: SMOKING CESSATION | Facility: CLINIC | Age: 63
End: 2018-01-23
Payer: COMMERCIAL

## 2018-01-23 DIAGNOSIS — F17.200 NICOTINE DEPENDENCE: Primary | ICD-10-CM

## 2018-01-23 PROCEDURE — 99404 PREV MED CNSL INDIV APPRX 60: CPT | Mod: S$GLB,,,

## 2018-01-23 PROCEDURE — 99999 PR PBB SHADOW E&M-EST. PATIENT-LVL I: CPT | Mod: PBBFAC,,,

## 2018-01-23 RX ORDER — IBUPROFEN 200 MG
1 TABLET ORAL DAILY
Qty: 14 PATCH | Refills: 0 | Status: SHIPPED | OUTPATIENT
Start: 2018-01-23 | End: 2018-02-06 | Stop reason: SDUPTHER

## 2018-01-29 ENCOUNTER — OFFICE VISIT (OUTPATIENT)
Dept: WOUND CARE | Facility: HOSPITAL | Age: 63
End: 2018-01-29
Attending: SURGERY
Payer: OTHER MISCELLANEOUS

## 2018-01-29 VITALS — SYSTOLIC BLOOD PRESSURE: 123 MMHG | HEART RATE: 59 BPM | DIASTOLIC BLOOD PRESSURE: 60 MMHG | RESPIRATION RATE: 20 BRPM

## 2018-01-29 DIAGNOSIS — F17.210 CIGARETTE SMOKER: Primary | ICD-10-CM

## 2018-01-29 DIAGNOSIS — L89.324 DECUBITUS ULCER OF LEFT ISCHIUM, STAGE 4: ICD-10-CM

## 2018-01-29 DIAGNOSIS — L89.892 PRESSURE ULCER OF LEFT LEG, STAGE 2: ICD-10-CM

## 2018-01-29 PROCEDURE — 27201912 HC WOUND CARE DEBRIDEMENT SUPPLIES

## 2018-01-29 PROCEDURE — 11042 DBRDMT SUBQ TIS 1ST 20SQCM/<: CPT

## 2018-01-29 NOTE — PROGRESS NOTES
[facility]  Wound Care  Progress Note    Problem List Items Addressed This Visit        Derm    Decubitus ulcer of left ischium, stage 4    Overview     Location: Left ischial region  Duration: Since 11/22/2015  Context: Decubitus ulcer of the left ischial region  Patient developed new wound on the right ischial region noted 1/9/2017. The wound was thought to be related to tubing from the wound vac and pressure. This wound is now healed  Associated Signs and Symptoms: Patient has no pain. He is insensate   Modifying Factors: The patient continues to smoke despite recommendations to stop. He has made no progress.  The wound vac was Utilized until January 9, 2017 on the left ischial region.  The patient completed 3 months therapy of antibiotic compound on 1/15/2018.  The patient sits from 5 am to 8 pm daily in a wheelchair.           Current Assessment & Plan     Patient has made little adjustment in his daily activity.  His wound remains clean.         Pressure ulcer of left leg, stage 2    Overview     Patient developed superficial ulceration of his left posterior lower leg noted 1/15/2018.  There has been minimal drainage.         Current Assessment & Plan     We will remains stable with no worsening            Other    Cigarette smoker - Primary    Current Assessment & Plan     Patient instructed to stop.  Counseling offered               See wound doc progress notes. Documents will be scanned.        Gilmar Christopher  [facility]

## 2018-02-06 ENCOUNTER — CLINICAL SUPPORT (OUTPATIENT)
Dept: SMOKING CESSATION | Facility: CLINIC | Age: 63
End: 2018-02-06
Payer: COMMERCIAL

## 2018-02-06 DIAGNOSIS — F17.200 NICOTINE DEPENDENCE: Primary | ICD-10-CM

## 2018-02-06 PROCEDURE — 99999 PR PBB SHADOW E&M-EST. PATIENT-LVL I: CPT | Mod: PBBFAC,,,

## 2018-02-06 PROCEDURE — 99404 PREV MED CNSL INDIV APPRX 60: CPT | Mod: S$GLB,,,

## 2018-02-06 RX ORDER — IBUPROFEN 200 MG
1 TABLET ORAL DAILY
Qty: 14 PATCH | Refills: 0 | Status: SHIPPED | OUTPATIENT
Start: 2018-02-06 | End: 2018-02-20 | Stop reason: SDUPTHER

## 2018-02-06 RX ORDER — ASPIRIN/CALCIUM CARB/MAGNESIUM 325 MG
4 TABLET ORAL
Qty: 24 LOZENGE | Refills: 0 | Status: SHIPPED | OUTPATIENT
Start: 2018-02-06 | End: 2018-02-20 | Stop reason: SDUPTHER

## 2018-02-06 RX ORDER — DIPHENHYDRAMINE HCL 25 MG
4 CAPSULE ORAL
Qty: 40 EACH | Refills: 0 | Status: SHIPPED | OUTPATIENT
Start: 2018-02-06 | End: 2018-02-20

## 2018-02-06 NOTE — PROGRESS NOTES
Individual Follow-Up Form    2/6/2018      Clinical Status of Patient: Outpatient    Length of Service: 60 minutes    Continuing Medication: yes  Patches       Target Symptoms: Withdrawal and medication side effects. The following were rated moderate (3) to severe (4) on TCRS:  · Moderate (3):  None   · Severe (4):  None     Comments:  Patient is smoking 6-8 cigarettes per day and remains on the prescribed tobacco cessation medication regimen of 21 mg nicotine patches without any negative side effects at this time. Patient states that the cravings are being taken care of with the patches, but it doesn't stop the urge to go outside and smoke. Orientation, completion of TCRS (Tobacco Cessation Rating Scale) learned addiction model, cues/triggers, personal reasons for quitting, medications, goals, quit date. Also discussed with patient the option of adding nicotine gum and/or nicotine lozenges to his regimen. Patient stated he would like to try both of these. This prescription will be taken care of today. Patient's goal over the next two weeks is to decrease to 3-5 cigarettes per day. He is scheduled for a follow up visit on 2/20/2018 @ 10 am and is encouraged to call [phone] with any questions or concerns.     Diagnosis: F17.200    Next Visit: 2 weeks

## 2018-02-19 ENCOUNTER — OFFICE VISIT (OUTPATIENT)
Dept: WOUND CARE | Facility: HOSPITAL | Age: 63
End: 2018-02-19
Attending: SURGERY
Payer: COMMERCIAL

## 2018-02-19 VITALS — RESPIRATION RATE: 20 BRPM | HEART RATE: 62 BPM | DIASTOLIC BLOOD PRESSURE: 86 MMHG | SYSTOLIC BLOOD PRESSURE: 106 MMHG

## 2018-02-19 DIAGNOSIS — L89.324 DECUBITUS ULCER OF LEFT ISCHIUM, STAGE 4: ICD-10-CM

## 2018-02-19 PROCEDURE — 27201912 HC WOUND CARE DEBRIDEMENT SUPPLIES

## 2018-02-19 PROCEDURE — 11042 DBRDMT SUBQ TIS 1ST 20SQCM/<: CPT

## 2018-02-19 NOTE — PROGRESS NOTES
[facility]  Wound Care  Progress Note    Problem List Items Addressed This Visit     Decubitus ulcer of left ischium, stage 4    Overview     Location: Left ischial region  Duration: Since 11/22/2015  Context: Decubitus ulcer of the left ischial region  Patient developed new wound on the right ischial region noted 1/9/2017. The wound was thought to be related to tubing from the wound vac and pressure. This wound is now healed  Associated Signs and Symptoms: Patient has no pain. He is insensate   Modifying Factors: The patient continues to smoke despite recommendations to stop. He has made no progress.  The wound vac was Utilized until January 9, 2017 on the left ischial region.  The patient completed 3 months therapy of antibiotic compound on 1/15/2018.  The patient sits from 5 am to 8 pm daily in a wheelchair.  Wound remains clean and granulating with no exposed bone or signs of infection although there is a slight odor today.         Current Assessment & Plan     Instructed to wash the wound with Dakin solution and continue silver dressing.                 See wound doc progress notes. Documents will be scanned.        Manny Vann  [facility]

## 2018-02-20 ENCOUNTER — CLINICAL SUPPORT (OUTPATIENT)
Dept: SMOKING CESSATION | Facility: CLINIC | Age: 63
End: 2018-02-20
Payer: COMMERCIAL

## 2018-02-20 DIAGNOSIS — F17.200 NICOTINE DEPENDENCE: Primary | ICD-10-CM

## 2018-02-20 PROCEDURE — 99999 PR PBB SHADOW E&M-EST. PATIENT-LVL I: CPT | Mod: PBBFAC,,,

## 2018-02-20 PROCEDURE — 99403 PREV MED CNSL INDIV APPRX 45: CPT | Mod: S$GLB,,,

## 2018-02-20 RX ORDER — IBUPROFEN 200 MG
1 TABLET ORAL DAILY
Qty: 14 PATCH | Refills: 0 | Status: SHIPPED | OUTPATIENT
Start: 2018-02-20 | End: 2018-03-06 | Stop reason: SDUPTHER

## 2018-02-20 RX ORDER — ASPIRIN/CALCIUM CARB/MAGNESIUM 325 MG
4 TABLET ORAL
Qty: 96 LOZENGE | Refills: 0 | Status: SHIPPED | OUTPATIENT
Start: 2018-02-20 | End: 2018-03-06 | Stop reason: SDUPTHER

## 2018-02-20 NOTE — PROGRESS NOTES
Individual Follow-Up Form    2/20/2018    Planned Quit Date: 3/20/2018    Clinical Status of Patient: Outpatient    Length of Service: 45 minutes    Continuing Medication: yes  Patches or Nicotine Lozenges       Target Symptoms: Withdrawal and medication side effects. The following were rated moderate (3) to severe (4) on TCRS:  · Moderate (3):  None   · Severe (4):  None     Comments: Patient is smoking 6-7 cigarettes per day and remains on the prescribed tobacco cessation medication regimen of 21 mg nicotine patches and 4 mg nicotine lozenges without any negative side effects at this time. Completion of TCRS (Tobacco Cessation Rating Scale) reviewed strategies, cues, and triggers. Introduced the negative impact of tobacco on health, the health advantages of discontinuing the use of tobacco, time line improved health changes after a quit, withdrawal issues to expect from nicotine and habit, and ways to achieve the goal of a quit. Patient's goal over the next two weeks is to decrease to 3-4 cigarettes per day. He is scheduled for a follow up visit on 3/6/2018 @ 10 am and is encouraged to call [phone] with any questions or concerns.       Diagnosis: F17.200    Next Visit: 2 weeks

## 2018-03-06 ENCOUNTER — CLINICAL SUPPORT (OUTPATIENT)
Dept: SMOKING CESSATION | Facility: CLINIC | Age: 63
End: 2018-03-06
Payer: COMMERCIAL

## 2018-03-06 DIAGNOSIS — F17.200 NICOTINE DEPENDENCE: ICD-10-CM

## 2018-03-06 PROCEDURE — 99404 PREV MED CNSL INDIV APPRX 60: CPT | Mod: S$GLB,,,

## 2018-03-06 PROCEDURE — 99999 PR PBB SHADOW E&M-EST. PATIENT-LVL I: CPT | Mod: PBBFAC,,,

## 2018-03-06 RX ORDER — ASPIRIN/CALCIUM CARB/MAGNESIUM 325 MG
4 TABLET ORAL
Qty: 216 LOZENGE | Refills: 0 | Status: SHIPPED | OUTPATIENT
Start: 2018-03-06 | End: 2018-03-20 | Stop reason: SDUPTHER

## 2018-03-06 RX ORDER — IBUPROFEN 200 MG
1 TABLET ORAL DAILY
Qty: 14 PATCH | Refills: 0 | Status: SHIPPED | OUTPATIENT
Start: 2018-03-06 | End: 2018-03-20 | Stop reason: SDUPTHER

## 2018-03-06 NOTE — PROGRESS NOTES
Individual Follow-Up Form    3/6/2018    Planned Quit Date: 4/1/2018    Clinical Status of Patient: Outpatient    Length of Service: 60 minutes    Continuing Medication: yes  Patches or Nicotine Lozenges       Target Symptoms: Withdrawal and medication side effects. The following were rated moderate (3) to severe (4) on TCRS:  · Moderate (3):  None   · Severe (4):  None     Comments:  Patient is smoking 7 cigarettes per day and remains on the prescribed tobacco cessation medication regimen of 21 mg nicotine patches and 4 mg nicotine lozenges without any negative side effects at this time. Completion of TCRS (Tobacco Cessation Rating Scale) reviewed strategies, controlling environment, cues, triggers, new goals set. Introduced high risk situations with preparation interventions, caffeine similarities with withdrawal issues of habit and nicotine, Alcohol, Understanding urges, cravings, stress and relaxation. Open discussion with intervention discussion. Patient states his hardest time to not smoke is in the morning with his coffee. Discussed with patient other things he could possibly do to change his routine. Patient's goal over the next two weeks is to decrease to 4 cigarettes per day. He is scheduled for a follow up visit on 3/20/2018 @ 10 am and is encouraged to call [phone] with any questions or concerns.         Diagnosis: F17.200    Next Visit: 2 weeks

## 2018-03-12 ENCOUNTER — OFFICE VISIT (OUTPATIENT)
Dept: WOUND CARE | Facility: HOSPITAL | Age: 63
End: 2018-03-12
Attending: SURGERY
Payer: COMMERCIAL

## 2018-03-12 VITALS
RESPIRATION RATE: 18 BRPM | HEART RATE: 65 BPM | DIASTOLIC BLOOD PRESSURE: 61 MMHG | TEMPERATURE: 98 F | SYSTOLIC BLOOD PRESSURE: 98 MMHG

## 2018-03-12 DIAGNOSIS — L89.324 DECUBITUS ULCER OF LEFT ISCHIUM, STAGE 4: ICD-10-CM

## 2018-03-12 PROCEDURE — 11042 DBRDMT SUBQ TIS 1ST 20SQCM/<: CPT

## 2018-03-12 PROCEDURE — 27201912 HC WOUND CARE DEBRIDEMENT SUPPLIES

## 2018-03-20 ENCOUNTER — CLINICAL SUPPORT (OUTPATIENT)
Dept: SMOKING CESSATION | Facility: CLINIC | Age: 63
End: 2018-03-20
Payer: COMMERCIAL

## 2018-03-20 DIAGNOSIS — F17.200 NICOTINE DEPENDENCE: Primary | ICD-10-CM

## 2018-03-20 PROCEDURE — 99999 PR PBB SHADOW E&M-EST. PATIENT-LVL I: CPT | Mod: PBBFAC,,,

## 2018-03-20 PROCEDURE — 99404 PREV MED CNSL INDIV APPRX 60: CPT | Mod: S$GLB,,,

## 2018-03-20 RX ORDER — ASPIRIN/CALCIUM CARB/MAGNESIUM 325 MG
4 TABLET ORAL
Qty: 216 LOZENGE | Refills: 0 | Status: SHIPPED | OUTPATIENT
Start: 2018-03-20 | End: 2018-04-03

## 2018-03-20 RX ORDER — IBUPROFEN 200 MG
1 TABLET ORAL DAILY
Qty: 14 PATCH | Refills: 0 | Status: SHIPPED | OUTPATIENT
Start: 2018-03-20 | End: 2018-04-03

## 2018-03-20 NOTE — PROGRESS NOTES
Individual Follow-Up Form    3/20/2018    Planned Quit Date: 4/15/2018    Clinical Status of Patient: Outpatient    Length of Service: 60 minutes    Continuing Medication: yes  Patches or Nicotine Lozenges       Target Symptoms: Withdrawal and medication side effects. The following were rated moderate (3) to severe (4) on TCRS:  · Moderate (3):  None   · Severe (4):  None     Comments:  Patient is smoking 7-8 cigarettes per day and remains on the prescribed tobacco cessation medication regimen of 21 mg nicotine patches and 4 mg nicotine lozenges without any negative side effects at this time. Patient states that he is having a hard time not smoking more than 3 almost in a row first thing in the morning. Suggested to patient that he change his morning routine. That instead of going straight outside as soon as he can, he may want to drink his coffee first, then wait an hour before going outside for his first cigarette. Patient stated he will try this. He also stated that he does not crave the nicotine, but more so the want for the habit. Completion of TCRS (Tobacco Cessation Rating Scale) reviewed strategies, habitual behavior, stress, and high risk situations. Introduced stress with addition interventions, SOLVE, relaxation with interventions, nutrition, exercise, weight gain, and the importance of rewarding oneself for accomplishments toward becoming tobacco free. Open discussion of all items with interventions.  Patient's goal over the next week is to decrease to 3-4 cigarettes per day. He is scheduled for a follow up visit on 3/26/2018 @ 9 am and is encouraged to call [phone] with any questions or concerns.       Diagnosis: F17.200    Next Visit: 1 week

## 2018-04-02 ENCOUNTER — CLINICAL SUPPORT (OUTPATIENT)
Dept: SMOKING CESSATION | Facility: CLINIC | Age: 63
End: 2018-04-02
Payer: COMMERCIAL

## 2018-04-02 ENCOUNTER — OFFICE VISIT (OUTPATIENT)
Dept: WOUND CARE | Facility: HOSPITAL | Age: 63
End: 2018-04-02
Attending: SURGERY
Payer: COMMERCIAL

## 2018-04-02 VITALS
DIASTOLIC BLOOD PRESSURE: 87 MMHG | SYSTOLIC BLOOD PRESSURE: 141 MMHG | RESPIRATION RATE: 18 BRPM | TEMPERATURE: 98 F | HEART RATE: 88 BPM

## 2018-04-02 DIAGNOSIS — F17.200 NICOTINE DEPENDENCE: Primary | ICD-10-CM

## 2018-04-02 DIAGNOSIS — L89.324 DECUBITUS ULCER OF LEFT ISCHIUM, STAGE 4: Primary | ICD-10-CM

## 2018-04-02 PROCEDURE — 11042 DBRDMT SUBQ TIS 1ST 20SQCM/<: CPT

## 2018-04-02 PROCEDURE — 99999 PR PBB SHADOW E&M-EST. PATIENT-LVL I: CPT | Mod: PBBFAC,,,

## 2018-04-02 PROCEDURE — 27201912 HC WOUND CARE DEBRIDEMENT SUPPLIES

## 2018-04-02 PROCEDURE — 99404 PREV MED CNSL INDIV APPRX 60: CPT | Mod: S$GLB,,,

## 2018-04-02 NOTE — Clinical Note
The patient is smoking 1/2 pack/day vs. 2 pks per day. He currently is using the 21 mg patches and 4 mg lozenges. The patient states he will light up out of habit and not even really know he did. The patient states he does have some cravings, but is still able to reduce the number per day. He was given a cigarette log and urge log to see if he can why he lit a cigarette and the cues such as boredom. completion of TCRS (Tobacco Cessation Rating Scale) reviewed strategies, habitual behavior, high risks situations, understanding urges and cravings, stress and relaxation with open discussion and additional interventions, Introduced lapses, relapses, understanding them and analyzing the situation of a lapse, conflict issues that may be linked to a lapse. The patient denies any abnormal behavioral or mental changes at this time.

## 2018-04-02 NOTE — PROGRESS NOTES
Individual Follow-Up Form    4/2/2018    Quit Date: TBD    Clinical Status of Patient: Outpatient    Length of Service: 60 minutes    Continuing Medication: yes  Patches    Other Medications: 4 mg lozenges     Target Symptoms: Withdrawal and medication side effects. The following were  rated moderate (3) to severe (4) on TCRS:  · Moderate (3): none  · Severe (4): none    Comments: The patient is smoking 1/2 pack/day vs. 2 pks per day. He currently is using the 21 mg patches and 4 mg lozenges. The patient states he will light up out of habit and not even really know he did. The patient states he does have some cravings, but is still able to reduce the number per day. He was given a cigarette log and urge log to see if he can why he lit a cigarette and the cues such as boredom. completion of TCRS (Tobacco Cessation Rating Scale) reviewed strategies, habitual behavior, high risks situations, understanding urges and cravings, stress and relaxation with open discussion and additional interventions, Introduced lapses, relapses, understanding them and analyzing the situation of a lapse, conflict issues that may be linked to a lapse. The patient denies any abnormal behavioral or mental changes at this time. The patient will continue with individual therapy sessions and medication monitoring by CTTS. Prescribed medication management will be by physician.       Diagnosis: F17.210    Next Visit: 1 week

## 2018-04-02 NOTE — PROGRESS NOTES
[facility]  Wound Care  Progress Note    Problem List Items Addressed This Visit     Decubitus ulcer of left ischium, stage 4 - Primary    Overview     Location: Left ischial region  Duration: Since 11/22/2015  Context: Decubitus ulcer of the left ischial region  Patient developed new wound on the right ischial region noted 1/9/2017. The wound was thought to be related to tubing from the wound vac and pressure. This wound is now healed  Associated Signs and Symptoms: Patient has no pain. He is insensate   Modifying Factors: The patient continues to smoke despite recommendations to stop. He has made no progress.  The wound vac was Utilized until January 9, 2017 on the left ischial region.  The patient completed 3 months therapy of antibiotic compound on 1/15/2018.  The patient sits from 5 am to 8 pm daily in a wheelchair.  Wound remains clean and granulating with no exposed bone or signs of infection.  There is no drainage from the area.               See wound doc progress notes. Documents will be scanned.        Manny Vann  [facility]

## 2018-04-16 ENCOUNTER — OFFICE VISIT (OUTPATIENT)
Dept: WOUND CARE | Facility: HOSPITAL | Age: 63
End: 2018-04-16
Attending: SURGERY
Payer: COMMERCIAL

## 2018-04-16 VITALS
RESPIRATION RATE: 18 BRPM | SYSTOLIC BLOOD PRESSURE: 134 MMHG | HEART RATE: 77 BPM | TEMPERATURE: 98 F | DIASTOLIC BLOOD PRESSURE: 66 MMHG

## 2018-04-16 DIAGNOSIS — L89.324 DECUBITUS ULCER OF LEFT ISCHIUM, STAGE 4: ICD-10-CM

## 2018-04-16 PROCEDURE — 87070 CULTURE OTHR SPECIMN AEROBIC: CPT

## 2018-04-16 PROCEDURE — 87077 CULTURE AEROBIC IDENTIFY: CPT

## 2018-04-16 PROCEDURE — 11042 DBRDMT SUBQ TIS 1ST 20SQCM/<: CPT

## 2018-04-16 PROCEDURE — 87205 SMEAR GRAM STAIN: CPT

## 2018-04-16 PROCEDURE — 87186 SC STD MICRODIL/AGAR DIL: CPT

## 2018-04-16 PROCEDURE — 27201912 HC WOUND CARE DEBRIDEMENT SUPPLIES

## 2018-04-16 NOTE — PROGRESS NOTES
[facility]  Wound Care  Progress Note    Problem List Items Addressed This Visit     Decubitus ulcer of left ischium, stage 4    Overview     Location: Left ischial region  Duration: Since 11/22/2015  Context: Decubitus ulcer of the left ischial region  Patient developed new wound on the right ischial region noted 1/9/2017. The wound was thought to be related to tubing from the wound vac and pressure. This wound is now healed  Associated Signs and Symptoms: Patient has no pain. He is insensate   Modifying Factors: The patient continues to smoke despite recommendations to stop. He has made no progress.  The wound vac was Utilized until January 9, 2017 on the left ischial region.  The patient completed 3 months therapy of antibiotic compound on 1/15/2018.  The patient sits from 5 am to 8 pm daily in a wheelchair.  Wound remains clean and granulating with no exposed bone or signs of infection.  There is report of increased drainage this week along with increased size of the wound.  We will take a culture today and if colonized with bacteria switch to appropriate topical antibiotic.               See wound doc progress notes. Documents will be scanned.        Manny Vann  [facility]

## 2018-04-17 ENCOUNTER — TELEPHONE (OUTPATIENT)
Dept: SMOKING CESSATION | Facility: CLINIC | Age: 63
End: 2018-04-17

## 2018-04-17 ENCOUNTER — CLINICAL SUPPORT (OUTPATIENT)
Dept: SMOKING CESSATION | Facility: CLINIC | Age: 63
End: 2018-04-17
Payer: COMMERCIAL

## 2018-04-17 DIAGNOSIS — F17.210 MODERATE CIGARETTE SMOKER (10-19 PER DAY): Primary | ICD-10-CM

## 2018-04-17 PROCEDURE — 99406 BEHAV CHNG SMOKING 3-10 MIN: CPT | Mod: S$GLB,,,

## 2018-04-17 RX ORDER — ASPIRIN/CALCIUM CARB/MAGNESIUM 325 MG
4 TABLET ORAL 3 TIMES DAILY
Qty: 96 LOZENGE | Refills: 0 | Status: SHIPPED | OUTPATIENT
Start: 2018-04-17 | End: 2018-06-04 | Stop reason: SDUPTHER

## 2018-04-17 RX ORDER — IBUPROFEN 200 MG
1 TABLET ORAL DAILY
Qty: 14 PATCH | Refills: 0 | Status: SHIPPED | OUTPATIENT
Start: 2018-04-17 | End: 2018-06-04 | Stop reason: SDUPTHER

## 2018-04-19 LAB
BACTERIA THROAT CULT: NORMAL
GRAM STN SPEC: NORMAL
GRAM STN SPEC: NORMAL

## 2018-04-23 ENCOUNTER — OFFICE VISIT (OUTPATIENT)
Dept: WOUND CARE | Facility: HOSPITAL | Age: 63
End: 2018-04-23
Attending: SURGERY
Payer: COMMERCIAL

## 2018-04-23 ENCOUNTER — CLINICAL SUPPORT (OUTPATIENT)
Dept: SMOKING CESSATION | Facility: CLINIC | Age: 63
End: 2018-04-23
Payer: COMMERCIAL

## 2018-04-23 VITALS
RESPIRATION RATE: 18 BRPM | SYSTOLIC BLOOD PRESSURE: 124 MMHG | HEART RATE: 69 BPM | DIASTOLIC BLOOD PRESSURE: 64 MMHG | TEMPERATURE: 98 F

## 2018-04-23 DIAGNOSIS — G82.20 PARAPLEGIA: Primary | ICD-10-CM

## 2018-04-23 DIAGNOSIS — A49.01 NON-INVASIVE METHICILLIN SENSITIVE STAPHYLOCOCCUS AUREUS INFECTION: ICD-10-CM

## 2018-04-23 DIAGNOSIS — L89.324 DECUBITUS ULCER OF LEFT ISCHIUM, STAGE 4: ICD-10-CM

## 2018-04-23 DIAGNOSIS — F17.210 MODERATE CIGARETTE SMOKER (10-19 PER DAY): Primary | ICD-10-CM

## 2018-04-23 DIAGNOSIS — F17.210 CIGARETTE SMOKER: ICD-10-CM

## 2018-04-23 PROBLEM — L89.892: Status: RESOLVED | Noted: 2018-01-29 | Resolved: 2018-04-23

## 2018-04-23 PROCEDURE — 11042 DBRDMT SUBQ TIS 1ST 20SQCM/<: CPT

## 2018-04-23 PROCEDURE — 27201912 HC WOUND CARE DEBRIDEMENT SUPPLIES

## 2018-04-23 PROCEDURE — 99403 PREV MED CNSL INDIV APPRX 45: CPT | Mod: S$GLB,,,

## 2018-04-23 PROCEDURE — 99999 PR PBB SHADOW E&M-EST. PATIENT-LVL I: CPT | Mod: PBBFAC,,,

## 2018-04-23 RX ORDER — IBUPROFEN 200 MG
1 TABLET ORAL DAILY
Qty: 14 PATCH | Refills: 0 | Status: SHIPPED | OUTPATIENT
Start: 2018-04-23 | End: 2018-08-27

## 2018-04-23 RX ORDER — ASPIRIN/CALCIUM CARB/MAGNESIUM 325 MG
4 TABLET ORAL
Qty: 144 LOZENGE | Refills: 0 | Status: SHIPPED | OUTPATIENT
Start: 2018-04-23 | End: 2018-05-23

## 2018-04-23 NOTE — PROGRESS NOTES
Individual Follow-Up Form    4/23/2018    Quit Date: TBD    Clinical Status of Patient: Outpatient    Length of Service: 45 minutes    Continuing Medication: yes  Patches    Other Medications: 2 mg lozenges     Target Symptoms: Withdrawal and medication side effects. The following were  rated moderate (3) to severe (4) on TCRS:  · Moderate (3): desire/crave nicotine withdrawal,   · Severe (4): none    Comments: The patient is still smoking 10 cigarettes/day using the 21 mg patches and 4 mg lozenges. CO measure is 18 which indicates a heavy smoker. The patient states he smokes outside, but finds he smokes more when not busy. Suggested some crafts to do, coloring book, tv and to start limiting the time between cigarettes to get down to 7 cigarettes /day. completion of TCRS (Tobacco Cessation Rating Scale) reviewed strategies, cues, triggers, high risk situations, lapses, relapses, diet, exercise, stress, relaxation, sleep, habitual behavior, and life style changes. The patient denies any abnormal behavioral or mental changes at this time. The patient will continue with individual   therapy sessions and medication monitoring by CTTS. Prescribed medication management will be by practitioner      Diagnosis: F17.210    Next Visit: 1 week

## 2018-04-23 NOTE — PROGRESS NOTES
[facility]  Wound Care  Progress Note    Problem List Items Addressed This Visit        Neuro    Paraplegia - Primary       Derm    Decubitus ulcer of left ischium, stage 4    Overview     Location: Left ischial region  Duration: Since 11/22/2015  Context: Decubitus ulcer of the left ischial region  Patient developed new wound on the right ischial region noted 1/9/2017. The wound was thought to be related to tubing from the wound vac and pressure. This wound healed  Associated Signs and Symptoms: Patient has no pain. He is insensate   Modifying Factors: The patient continues to smoke despite recommendations to stop. He has made no progress.  The wound vac was Utilized until January 9, 2017 on the left ischial region.  The patient completed 3 months therapy of antibiotic compound on 1/15/2018.  The patient sits from 5 am to 8 pm daily in a wheelchair.  Patient indicates he has not had any additional time of sitting.  On 416/2018, the patient was noted to have increased drainage and odor from his wound.  There was concern about infection and a culture was taken now revealing methicillin sensitive staph aureus.  Drainage and odor are improved today.         Current Assessment & Plan     Patient has a methicillin sensitive staph aureus infection.  The wound is improved on this visit.    A 10 day course of Bactrim will be prescribed.            ID    Non-invasive methicillin sensitive Staphylococcus aureus infection       Other    Cigarette smoker          See wound doc progress notes. Documents will be scanned.        Gilmar Christopher  [facility]

## 2018-04-23 NOTE — ASSESSMENT & PLAN NOTE
Patient has a methicillin sensitive staph aureus infection.  The wound is improved on this visit.    A 10 day course of Bactrim will be prescribed.

## 2018-04-23 NOTE — Clinical Note
The patient is still smoking 10 cigarettes/day using the 21 mg patches and 4 mg lozenges. CO measure is 18 which indicates a heavy smoker. The patient states he smokes outside, but finds he smokes more when not busy. Suggested some crafts to do, coloring book, tv and to start limiting the time between cigarettes to get down to 7 cigarettes /day. completion of TCRS (Tobacco Cessation Rating Scale) reviewed strategies, cues, triggers, high risk situations, lapses, relapses, diet, exercise, stress, relaxation, sleep, habitual behavior, and life style changes. The patient denies any abnormal behavioral or mental changes at this time. The patient will continue with individual   therapy sessions and medication monitoring by CTTS. Prescribed medication management will be by practitioner

## 2018-04-30 ENCOUNTER — CLINICAL SUPPORT (OUTPATIENT)
Dept: SMOKING CESSATION | Facility: CLINIC | Age: 63
End: 2018-04-30
Payer: COMMERCIAL

## 2018-04-30 VITALS — OXYGEN SATURATION: 97 % | HEART RATE: 69 BPM

## 2018-04-30 DIAGNOSIS — F17.210 MODERATE CIGARETTE SMOKER (10-19 PER DAY): Primary | ICD-10-CM

## 2018-04-30 PROCEDURE — 99999 PR PBB SHADOW E&M-EST. PATIENT-LVL II: CPT | Mod: PBBFAC,,,

## 2018-04-30 PROCEDURE — 99403 PREV MED CNSL INDIV APPRX 45: CPT | Mod: S$GLB,,,

## 2018-04-30 NOTE — Clinical Note
The patient is down to 7 cigarettes/day using the 21 mg patch and 4 mg lozenges, down from 10 cigarettes/day. The patient states the counseling sessins and the smoking diary have really helped. He is really excited about the 7/day and will try for 4-5 cigarettes in 2 weeks. He does not want to give up the 2 cigarettes in the A.M. With his coffee, but is willing to cut out the afternoon cigarettes. Praised on his ability to cut back and encouraged to cut back more. completion of TCRS (Tobacco Cessation Rating Scale) reviewed strategies, cues, triggers, high risk situations, lapses, relapses, diet, exercise, stress, relaxation, sleep, habitual behavior, and life style changes.The patient denies any abnormal behavioral or mental changes at this time. The patient will continue with individual  therapy sessions and medication monitoring by CTTS.

## 2018-04-30 NOTE — PROGRESS NOTES
Individual Follow-Up Form  Individual 8    4/30/2018    Quit Date: TBD    Clinical Status of Patient: Outpatient    Length of Service: 45 minutes    Continuing Medication: yes  Patches    Other Medications: 4 mg lozenges     Target Symptoms: Withdrawal and medication side effects. The following were  rated moderate (3) to severe (4) on TCRS:  · Moderate (3): none  · Severe (4): none    Comments: The patient is down to 7 cigarettes/day using the 21 mg patch and 4 mg lozenges, down from 10 cigarettes/day. The patient states the counseling sessins and the smoking diary have really helped. He is really excited about the 7/day and will try for 4-5 cigarettes in 2 weeks. He does not want to give up the 2 cigarettes in the A.M. With his coffee, but is willing to cut out the afternoon cigarettes. Praised on his ability to cut back and encouraged to cut back more. completion of TCRS (Tobacco Cessation Rating Scale) reviewed strategies, cues, triggers, high risk situations, lapses, relapses, diet, exercise, stress, relaxation, sleep, habitual behavior, and life style changes.The patient denies any abnormal behavioral or mental changes at this time. The patient will continue with individual  therapy sessions and medication monitoring by CTTS. Prescribed medication management will be by practioner .       Diagnosis: F17.210    Next Visit: 2 weeks

## 2018-05-07 ENCOUNTER — OFFICE VISIT (OUTPATIENT)
Dept: WOUND CARE | Facility: HOSPITAL | Age: 63
End: 2018-05-07
Attending: SURGERY
Payer: COMMERCIAL

## 2018-05-07 VITALS
TEMPERATURE: 98 F | DIASTOLIC BLOOD PRESSURE: 73 MMHG | SYSTOLIC BLOOD PRESSURE: 132 MMHG | HEART RATE: 63 BPM | RESPIRATION RATE: 18 BRPM

## 2018-05-07 DIAGNOSIS — F17.210 CIGARETTE SMOKER: ICD-10-CM

## 2018-05-07 DIAGNOSIS — G82.20 PARAPLEGIA: Primary | ICD-10-CM

## 2018-05-07 DIAGNOSIS — L89.324 DECUBITUS ULCER OF LEFT ISCHIUM, STAGE 4: ICD-10-CM

## 2018-05-07 PROBLEM — A49.01: Status: RESOLVED | Noted: 2018-04-23 | Resolved: 2018-05-07

## 2018-05-07 PROCEDURE — 11042 DBRDMT SUBQ TIS 1ST 20SQCM/<: CPT

## 2018-05-07 PROCEDURE — 27201912 HC WOUND CARE DEBRIDEMENT SUPPLIES

## 2018-05-07 NOTE — PROGRESS NOTES
[facility]  Wound Care  Progress Note    Problem List Items Addressed This Visit        Neuro    Paraplegia - Primary       Derm    Decubitus ulcer of left ischium, stage 4    Overview     Location: Left ischial region  Duration: Since 11/22/2015  Context: Decubitus ulcer of the left ischial region  Patient developed new wound on the right ischial region noted 1/9/2017. The wound was thought to be related to tubing from the wound vac and pressure. This wound healed  Associated Signs and Symptoms: Patient has no pain. He is insensate   Modifying Factors: The patient continues to smoke despite recommendations to stop. He has made no progress.  The wound vac was Utilized until January 9, 2017 on the left ischial region.  The patient completed 3 months therapy of antibiotic compound on 1/15/2018.  The patient sits from 5 am to 8 pm daily in a wheelchair.  Patient indicates he has not had any additional time of sitting.  On 416/2018, the patient was noted to have increased drainage and odor from his wound.  There was concern about infection and a culture was taken 4/16/2018 revealing methicillin sensitive staph aureus. Patient completed a course of Bactrim for 10 days.    Drainage and odor are improved.            Other    Cigarette smoker    Current Assessment & Plan     Patient instructed to stop smoking once again.               See wound doc progress notes. Documents will be scanned.        Gilmar Christopher  [facility]

## 2018-05-15 ENCOUNTER — CLINICAL SUPPORT (OUTPATIENT)
Dept: SMOKING CESSATION | Facility: CLINIC | Age: 63
End: 2018-05-15
Payer: COMMERCIAL

## 2018-05-15 DIAGNOSIS — F17.200 NICOTINE DEPENDENCE: Primary | ICD-10-CM

## 2018-05-15 PROCEDURE — 99407 BEHAV CHNG SMOKING > 10 MIN: CPT | Mod: S$GLB,,, | Performed by: INTERNAL MEDICINE

## 2018-05-15 NOTE — PROGRESS NOTES
Spoke with patient today in regard to smoking cessation progress for a 6 month follow up. Patient is progressing well and currently enrolled in program. Patient has appointment scheduled with CTTS and will follow up. Informed patient of benefit period and contact information if any help or support is needed. Will complete smart form for 3/6 month follow up on Quit attempt #3.

## 2018-06-04 ENCOUNTER — OFFICE VISIT (OUTPATIENT)
Dept: WOUND CARE | Facility: HOSPITAL | Age: 63
End: 2018-06-04
Attending: SURGERY
Payer: COMMERCIAL

## 2018-06-04 ENCOUNTER — CLINICAL SUPPORT (OUTPATIENT)
Dept: SMOKING CESSATION | Facility: CLINIC | Age: 63
End: 2018-06-04
Payer: COMMERCIAL

## 2018-06-04 VITALS
DIASTOLIC BLOOD PRESSURE: 69 MMHG | TEMPERATURE: 98 F | HEART RATE: 76 BPM | SYSTOLIC BLOOD PRESSURE: 129 MMHG | RESPIRATION RATE: 20 BRPM

## 2018-06-04 VITALS — HEART RATE: 76 BPM | OXYGEN SATURATION: 92 %

## 2018-06-04 DIAGNOSIS — F17.210 MODERATE CIGARETTE SMOKER (10-19 PER DAY): Primary | ICD-10-CM

## 2018-06-04 DIAGNOSIS — L89.324 DECUBITUS ULCER OF LEFT ISCHIUM, STAGE 4: ICD-10-CM

## 2018-06-04 PROCEDURE — 99404 PREV MED CNSL INDIV APPRX 60: CPT | Mod: S$GLB,,,

## 2018-06-04 PROCEDURE — 11042 DBRDMT SUBQ TIS 1ST 20SQCM/<: CPT

## 2018-06-04 PROCEDURE — 27201912 HC WOUND CARE DEBRIDEMENT SUPPLIES

## 2018-06-04 PROCEDURE — 99999 PR PBB SHADOW E&M-EST. PATIENT-LVL II: CPT | Mod: PBBFAC,,,

## 2018-06-04 RX ORDER — ASPIRIN/CALCIUM CARB/MAGNESIUM 325 MG
4 TABLET ORAL
Qty: 216 LOZENGE | Refills: 0 | Status: SHIPPED | OUTPATIENT
Start: 2018-06-04

## 2018-06-04 RX ORDER — IBUPROFEN 200 MG
1 TABLET ORAL DAILY
Qty: 14 PATCH | Refills: 0 | Status: SHIPPED | OUTPATIENT
Start: 2018-06-04 | End: 2018-07-30 | Stop reason: SDUPTHER

## 2018-06-04 NOTE — PROGRESS NOTES
[facility]  Wound Care  Progress Note    Problem List Items Addressed This Visit     Decubitus ulcer of left ischium, stage 4    Overview     Location: Left ischial region  Duration: Since 11/22/2015  Context: Decubitus ulcer of the left ischial region  Patient developed new wound on the right ischial region noted 1/9/2017. The wound was thought to be related to tubing from the wound vac and pressure. This wound healed  Associated Signs and Symptoms: Patient has no pain. He is insensate   Modifying Factors: The patient continues to smoke despite recommendations to stop. He has made no progress.  The wound vac was Utilized until January 9, 2017 on the left ischial region.  The patient completed 3 months therapy of antibiotic compound on 1/15/2018.  The patient sits from 5 am to 8 pm daily in a wheelchair.  Patient indicates he has not had any additional time of sitting.  On 416/2018, the patient was noted to have increased drainage and odor from his wound.  There was concern about infection and a culture was taken 4/16/2018 revealing methicillin sensitive staph aureus. Patient completed a course of Bactrim for 10 days.    Drainage and odor are improved.  Wound remains clean and granulating with minimal drainage.  There is no exposed bone.  Periwound area with some maceration.               See wound doc progress notes. Documents will be scanned.        Manny Vann  [facility]

## 2018-06-04 NOTE — PROGRESS NOTES
Individual Follow-Up Form    6/4/2018    Quit Date: TBD    Clinical Status of Patient: Outpatient    Length of Service: 60 minutes    Continuing Medication: yes  Patches    Other Medications: 4 mg lozenges     Target Symptoms: Withdrawal and medication side effects. The following were  rated moderate (3) to severe (4) on TCRS:  · Moderate (3): desire/crave anxious habit nicotine withdrawal  · Severe (4): none    Comments: The patient came by and stated he was smoking much more had increased from 7 cigarettes/day to `15 /day. He states he is frustrated by the increase. He states he si smoking 3 cigarettes in the morning with coffee. Will start drinking the coffee without the cigarette and cut out one of the morning cigarettes. He states he smokes in the afternoon due to boredom. Will try to cut out 1-2 of the cigarettes in 2 weeks. The patient stated he has not been to sessions in about a month and he has been out of patches for 2 weeks which could be the cause of the increase. Ordered patches and made appt. The patient denies any abnormal behavioral or mental changes at this time.The patient will continue individual sessions and medication monitoring by CTTS. Prescribed medication management will be by practitoner.      Diagnosis: F17.210    Next Visit: 2 weeks

## 2018-06-04 NOTE — Clinical Note
The patient came by and stated he was smoking much more had increased from 7 cigarettes/day to `15 /day. He states he is frustrated by the increase. He states he si smoking 3 cigarettes in the morning with coffee. Will start drinking the coffee without the cigarette and cut out one of the morning cigarettes. He states he smokes in the afternoon due to boredom. Will try to cut out 1-2 of the cigarettes in 2 weeks. The patient stated he has not been to sessions in about a month and he has been out of patches for 2 weeks which could be the cause of the increase. Ordered patches and made appt. The patient denies any abnormal behavioral or mental changes at this time.

## 2018-06-18 ENCOUNTER — CLINICAL SUPPORT (OUTPATIENT)
Dept: SMOKING CESSATION | Facility: CLINIC | Age: 63
End: 2018-06-18
Payer: COMMERCIAL

## 2018-06-18 ENCOUNTER — OFFICE VISIT (OUTPATIENT)
Dept: WOUND CARE | Facility: HOSPITAL | Age: 63
End: 2018-06-18
Attending: SURGERY
Payer: COMMERCIAL

## 2018-06-18 VITALS — RESPIRATION RATE: 20 BRPM | HEART RATE: 66 BPM | DIASTOLIC BLOOD PRESSURE: 71 MMHG | SYSTOLIC BLOOD PRESSURE: 125 MMHG

## 2018-06-18 DIAGNOSIS — F17.210 MODERATE CIGARETTE SMOKER (10-19 PER DAY): Primary | ICD-10-CM

## 2018-06-18 DIAGNOSIS — L89.324 DECUBITUS ULCER OF LEFT ISCHIUM, STAGE 4: ICD-10-CM

## 2018-06-18 PROCEDURE — 87070 CULTURE OTHR SPECIMN AEROBIC: CPT

## 2018-06-18 PROCEDURE — 99407 BEHAV CHNG SMOKING > 10 MIN: CPT | Mod: S$GLB,,,

## 2018-06-18 PROCEDURE — 11042 DBRDMT SUBQ TIS 1ST 20SQCM/<: CPT

## 2018-06-18 PROCEDURE — 27201912 HC WOUND CARE DEBRIDEMENT SUPPLIES

## 2018-06-18 PROCEDURE — 87205 SMEAR GRAM STAIN: CPT

## 2018-06-18 PROCEDURE — 87186 SC STD MICRODIL/AGAR DIL: CPT

## 2018-06-18 PROCEDURE — 87077 CULTURE AEROBIC IDENTIFY: CPT

## 2018-06-18 NOTE — PROGRESS NOTES
[facility]  Wound Care  Progress Note    Problem List Items Addressed This Visit     Decubitus ulcer of left ischium, stage 4    Overview     Location: Left ischial region  Duration: Since 11/22/2015  Context: Decubitus ulcer of the left ischial region  Patient developed new wound on the right ischial region noted 1/9/2017. The wound was thought to be related to tubing from the wound vac and pressure. This wound healed  Associated Signs and Symptoms: Patient has no pain. He is insensate   Modifying Factors: The patient continues to smoke despite recommendations to stop. He has made no progress.  The wound vac was Utilized until January 9, 2017 on the left ischial region.  The patient completed 3 months therapy of antibiotic compound on 1/15/2018.  The patient sits from 5 am to 8 pm daily in a wheelchair.  Patient indicates he has not had any additional time of sitting.  On 416/2018, the patient was noted to have increased drainage and odor from his wound.  There was concern about infection and a culture was taken 4/16/2018 revealing methicillin sensitive staph aureus. Patient completed a course of Bactrim for 10 days.   Patient's wound has more drainage and odor in the previous visit.  Patient denies any fever.  The wound bed is granulating and there is no exposed bone.  There is no sign of soft tissue infection.  Patient has been using topical silver.  Culture will be taken today and we will switch to topical antimicrobial to decrease colonization of wound bed.               See wound doc progress notes. Documents will be scanned.        Manny Vann  [facility]

## 2018-06-18 NOTE — PROGRESS NOTES
Called patient missed appt. Today. He stated he went [id number] - [place], [place] - [address] AT [facility] [address] [facility] [address]  and he was told they had not prescriptions for him. Rechecked re cepit by pharmacy and was received 6/4/ at 0951. This is the 3rd time the patient had trouble with medications. The patient cannot drive due to physical limitations. Will change medications to [facility] for home delivery. The patient is aggravated because he started smoking more because he cannot get his medications. He was down to about 10 cigarettes/day. The patient is still having trouble maintanaing a certain level of  Cigarettes/day smoked. Will continue to work on habit as well as  withdrawal symptoms.

## 2018-06-19 RX ORDER — IBUPROFEN 200 MG
1 TABLET ORAL DAILY
Qty: 14 PATCH | Refills: 0 | Status: SHIPPED | OUTPATIENT
Start: 2018-06-19 | End: 2018-07-16 | Stop reason: SDUPTHER

## 2018-06-19 RX ORDER — ASPIRIN/CALCIUM CARB/MAGNESIUM 325 MG
4 TABLET ORAL 4 TIMES DAILY
Qty: 270 LOZENGE | Refills: 0 | Status: SHIPPED | OUTPATIENT
Start: 2018-06-19

## 2018-06-21 LAB
BACTERIA THROAT CULT: NORMAL
GRAM STN SPEC: NORMAL
GRAM STN SPEC: NORMAL

## 2018-07-02 ENCOUNTER — OFFICE VISIT (OUTPATIENT)
Dept: WOUND CARE | Facility: HOSPITAL | Age: 63
End: 2018-07-02
Attending: SURGERY
Payer: COMMERCIAL

## 2018-07-02 ENCOUNTER — TELEPHONE (OUTPATIENT)
Dept: SMOKING CESSATION | Facility: CLINIC | Age: 63
End: 2018-07-02

## 2018-07-02 VITALS — DIASTOLIC BLOOD PRESSURE: 52 MMHG | HEART RATE: 64 BPM | RESPIRATION RATE: 18 BRPM | SYSTOLIC BLOOD PRESSURE: 99 MMHG

## 2018-07-02 DIAGNOSIS — G82.20 PARAPLEGIA: ICD-10-CM

## 2018-07-02 DIAGNOSIS — L89.324 DECUBITUS ULCER OF LEFT ISCHIUM, STAGE 4: Primary | ICD-10-CM

## 2018-07-02 DIAGNOSIS — F17.210 CIGARETTE SMOKER: ICD-10-CM

## 2018-07-02 PROCEDURE — 99499 UNLISTED E&M SERVICE: CPT | Mod: ,,, | Performed by: SURGERY

## 2018-07-02 PROCEDURE — 11042 DBRDMT SUBQ TIS 1ST 20SQCM/<: CPT

## 2018-07-02 PROCEDURE — 27201912 HC WOUND CARE DEBRIDEMENT SUPPLIES

## 2018-07-02 NOTE — PROGRESS NOTES
[facility]  Wound Care  Progress Note    Problem List Items Addressed This Visit        Neuro    Paraplegia       Derm    Decubitus ulcer of left ischium, stage 4 - Primary    Overview     Location: Left ischial region  Duration: Since 11/22/2015  Context: Decubitus ulcer of the left ischial region  Patient developed new wound on the right ischial region noted 1/9/2017. The wound was thought to be related to tubing from the wound vac and pressure. This wound healed  Associated Signs and Symptoms: Patient has no pain. He is insensate   Modifying Factors: The patient continues to smoke despite recommendations to stop. He has made no progress.  The wound vac was Utilized until January 9, 2017 on the left ischial region.  The patient completed 3 months therapy of antibiotic compound on 1/15/2018.  The patient sits from 5 am to 8 pm daily in a wheelchair.  Patient indicates he has not had any additional time of sitting.  On 416/2018, the patient was noted to have increased drainage and odor from his wound.  There was concern about infection and a culture was taken 4/16/2018 revealing methicillin sensitive staph aureus. Patient completed a course of Bactrim for 10 days.   Patient's wound noted to have more drainage and odor on 6/18/2018. Culture was taken to switch to topical antimicrobial to decrease colonization of wound bed. Awaiting delivery of topical.         Current Assessment & Plan     Epidermis is advancing across the depths of the wound.            Other    Cigarette smoker    Current Assessment & Plan     Patient continues to smoke although he indicates she's making some effort to decrease cigarette volume.  He was reminded to stop smoking to improve wound care.               See wound doc progress notes. Documents will be scanned.        Gilmar Christopher  [facility]

## 2018-07-02 NOTE — ASSESSMENT & PLAN NOTE
Patient continues to smoke although he indicates she's making some effort to decrease cigarette volume.  He was reminded to stop smoking to improve wound care.

## 2018-07-16 ENCOUNTER — TELEPHONE (OUTPATIENT)
Dept: SMOKING CESSATION | Facility: CLINIC | Age: 63
End: 2018-07-16

## 2018-07-16 ENCOUNTER — OFFICE VISIT (OUTPATIENT)
Dept: WOUND CARE | Facility: HOSPITAL | Age: 63
End: 2018-07-16
Attending: SURGERY
Payer: COMMERCIAL

## 2018-07-16 VITALS
HEART RATE: 75 BPM | TEMPERATURE: 98 F | DIASTOLIC BLOOD PRESSURE: 53 MMHG | RESPIRATION RATE: 18 BRPM | SYSTOLIC BLOOD PRESSURE: 107 MMHG

## 2018-07-16 DIAGNOSIS — G82.20 PARAPLEGIA: ICD-10-CM

## 2018-07-16 DIAGNOSIS — F17.210 CIGARETTE SMOKER: ICD-10-CM

## 2018-07-16 DIAGNOSIS — L89.324 DECUBITUS ULCER OF LEFT ISCHIUM, STAGE 4: Primary | ICD-10-CM

## 2018-07-16 DIAGNOSIS — F17.210 MODERATE CIGARETTE SMOKER (10-19 PER DAY): ICD-10-CM

## 2018-07-16 PROCEDURE — 27201912 HC WOUND CARE DEBRIDEMENT SUPPLIES

## 2018-07-16 PROCEDURE — 11042 DBRDMT SUBQ TIS 1ST 20SQCM/<: CPT

## 2018-07-16 PROCEDURE — 99499 UNLISTED E&M SERVICE: CPT | Mod: ,,, | Performed by: SURGERY

## 2018-07-16 RX ORDER — IBUPROFEN 200 MG
1 TABLET ORAL DAILY
Qty: 14 PATCH | Refills: 0 | Status: SHIPPED | OUTPATIENT
Start: 2018-07-16 | End: 2018-08-27

## 2018-07-16 NOTE — PROGRESS NOTES
[facility]  Wound Care  Progress Note    Problem List Items Addressed This Visit        Neuro    Paraplegia       Derm    Decubitus ulcer of left ischium, stage 4 - Primary    Overview     Location: Left ischial region  Duration: Since 11/22/2015  Context: Decubitus ulcer of the left ischial region  Patient developed new wound on the right ischial region noted 1/9/2017. The wound was thought to be related to tubing from the wound vac and pressure. This wound healed  Associated Signs and Symptoms: Patient has no pain. He is insensate   Modifying Factors: The patient continues to smoke despite recommendations to stop. He has made no progress.  The wound vac was Utilized until January 9, 2017 on the left ischial region.  The patient completed 3 months therapy of antibiotic compound on 1/15/2018.  The patient sits from 5 am to 8 pm daily in a wheelchair.  Patient indicates he has not had any additional time of sitting.  On 416/2018, the patient was noted to have increased drainage and odor from his wound.  There was concern about infection and a culture was taken 4/16/2018 revealing methicillin sensitive staph aureus. Patient completed a course of Bactrim for 10 days.   Patient's wound noted to have more drainage and odor on 6/18/2018. Culture was taken to switch to topical antimicrobial to decrease colonization of wound bed. Still awaiting delivery of topical as of 7/16/18..            Other    Cigarette smoker    Current Assessment & Plan     Patient instructed to stop smoking               See wound doc progress notes. Documents will be scanned.        Gilmar Christopher  [facility]

## 2018-07-16 NOTE — TELEPHONE ENCOUNTER
Called pt .missed appt. Prior appt took to long rescheduled 7/31/18@ 0700 reodered 21 mg patches to be mailed to pateint .

## 2018-07-30 ENCOUNTER — CLINICAL SUPPORT (OUTPATIENT)
Dept: SMOKING CESSATION | Facility: CLINIC | Age: 63
End: 2018-07-30
Payer: COMMERCIAL

## 2018-07-30 DIAGNOSIS — F17.210 MODERATE CIGARETTE SMOKER (10-19 PER DAY): ICD-10-CM

## 2018-07-30 DIAGNOSIS — F17.210 MODERATE CIGARETTE SMOKER (10-19 PER DAY): Primary | ICD-10-CM

## 2018-07-30 PROCEDURE — 99407 BEHAV CHNG SMOKING > 10 MIN: CPT | Mod: S$GLB,,,

## 2018-07-30 RX ORDER — IBUPROFEN 200 MG
1 TABLET ORAL DAILY
Qty: 14 PATCH | Refills: 0 | OUTPATIENT
Start: 2018-07-30

## 2018-07-30 RX ORDER — IBUPROFEN 200 MG
1 TABLET ORAL DAILY
Qty: 14 PATCH | Refills: 0 | Status: SHIPPED | OUTPATIENT
Start: 2018-07-30 | End: 2018-08-27

## 2018-07-30 NOTE — PROGRESS NOTES
The patient missed his appts today due to van broken down. He is a paraplegic and it is very difficult for him to get around. He has missed several appts due to lack of  and transportation. The patient had gotten down to 4-5 cigarettes / day using the 21 mg nicotine patch and 4 mg nicotine lozenges. He states he has adam dry mouth and some dizziness, but that may be due to other medications. He has basically not tried to quit without the patches because he feels he cannot. He is very limited as to what he can do for entertainment and has been looking for new things to do. Will reorder 21 mg nicotine patch, the patient made an appt.for 8/6/18. May do better trying phone sessions due to transportation difficulties.

## 2018-08-06 ENCOUNTER — OFFICE VISIT (OUTPATIENT)
Dept: WOUND CARE | Facility: HOSPITAL | Age: 63
End: 2018-08-06
Attending: SURGERY
Payer: COMMERCIAL

## 2018-08-06 VITALS
SYSTOLIC BLOOD PRESSURE: 132 MMHG | TEMPERATURE: 98 F | RESPIRATION RATE: 18 BRPM | DIASTOLIC BLOOD PRESSURE: 86 MMHG | HEART RATE: 87 BPM

## 2018-08-06 DIAGNOSIS — G82.20 PARAPLEGIA: ICD-10-CM

## 2018-08-06 DIAGNOSIS — F17.210 CIGARETTE SMOKER: ICD-10-CM

## 2018-08-06 DIAGNOSIS — L89.324 DECUBITUS ULCER OF LEFT ISCHIUM, STAGE 4: Primary | ICD-10-CM

## 2018-08-06 PROCEDURE — 11042 DBRDMT SUBQ TIS 1ST 20SQCM/<: CPT

## 2018-08-06 PROCEDURE — 27201912 HC WOUND CARE DEBRIDEMENT SUPPLIES

## 2018-08-06 PROCEDURE — 99499 UNLISTED E&M SERVICE: CPT | Mod: ,,, | Performed by: SURGERY

## 2018-08-06 NOTE — PROGRESS NOTES
[facility]  Wound Care  Progress Note    Problem List Items Addressed This Visit        Neuro    Paraplegia       Derm    Decubitus ulcer of left ischium, stage 4 - Primary    Overview     Location: Left ischial region  Duration: Since 11/22/2015  Context: Decubitus ulcer of the left ischial region  Patient developed new wound on the right ischial region noted 1/9/2017. The wound was thought to be related to tubing from the wound vac and pressure. This wound healed  Associated Signs and Symptoms: Patient has no pain. He is insensate   Modifying Factors: The patient continues to smoke despite recommendations to stop. He has made no progress.  The wound vac was Utilized until January 9, 2017 on the left ischial region.  The patient completed 3 months therapy of antibiotic compound on 1/15/2018.  The patient sits from 5 am to 8 pm daily in a wheelchair.  Patient indicates he has not had any additional time of sitting.  On 416/2018, the patient was noted to have increased drainage and odor from his wound.  There was concern about infection and a culture was taken 4/16/2018 revealing methicillin sensitive staph aureus. Patient completed a course of Bactrim for 10 days.   Patient's wound noted to have more drainage and odor on 6/18/2018. Culture was taken to switch to topical antimicrobial to decrease colonization of wound bed. Topical antibiotic has not yet been approved.         Current Assessment & Plan     Epithelialization is advancing along the wound bed.  Continue silver alginate and offloading.            Other    Cigarette smoker    Current Assessment & Plan     Patient continues to smoke.  He has been advised to stop once again.               See wound doc progress notes. Documents will be scanned.        Gilmar Christopher  [facility]

## 2018-08-20 ENCOUNTER — OFFICE VISIT (OUTPATIENT)
Dept: WOUND CARE | Facility: HOSPITAL | Age: 63
End: 2018-08-20
Attending: SURGERY
Payer: COMMERCIAL

## 2018-08-20 VITALS
TEMPERATURE: 98 F | DIASTOLIC BLOOD PRESSURE: 70 MMHG | HEART RATE: 60 BPM | SYSTOLIC BLOOD PRESSURE: 145 MMHG | RESPIRATION RATE: 20 BRPM

## 2018-08-20 DIAGNOSIS — G82.20 PARAPLEGIA: ICD-10-CM

## 2018-08-20 DIAGNOSIS — F17.210 CIGARETTE SMOKER: ICD-10-CM

## 2018-08-20 DIAGNOSIS — S31.819A BUTTOCK WOUND, RIGHT, INITIAL ENCOUNTER: ICD-10-CM

## 2018-08-20 DIAGNOSIS — L89.324 DECUBITUS ULCER OF LEFT ISCHIUM, STAGE 4: Primary | ICD-10-CM

## 2018-08-20 PROBLEM — S31.829A BUTTOCK WOUND, LEFT, INITIAL ENCOUNTER: Status: ACTIVE | Noted: 2018-08-20

## 2018-08-20 PROCEDURE — 97597 DBRDMT OPN WND 1ST 20 CM/<: CPT

## 2018-08-20 PROCEDURE — 99499 UNLISTED E&M SERVICE: CPT | Mod: ,,, | Performed by: SURGERY

## 2018-08-20 PROCEDURE — 11042 DBRDMT SUBQ TIS 1ST 20SQCM/<: CPT

## 2018-08-20 PROCEDURE — 99212 OFFICE O/P EST SF 10 MIN: CPT

## 2018-08-20 PROCEDURE — 27201912 HC WOUND CARE DEBRIDEMENT SUPPLIES

## 2018-08-20 NOTE — PROGRESS NOTES
[facility]  Wound Care  Progress Note    Problem List Items Addressed This Visit        Neuro    Paraplegia       Derm    Decubitus ulcer of left ischium, stage 4 - Primary    Overview     Location: Left ischial region  Duration: Since 11/22/2015  Context: Decubitus ulcer of the left ischial region  Patient developed new wound on the right ischial region noted 1/9/2017. The wound was thought to be related to tubing from the wound vac and pressure. This wound healed  Associated Signs and Symptoms: Patient has no pain. He is insensate   Modifying Factors: The patient continues to smoke despite recommendations to stop. He has made no progress.  The wound vac was Utilized until January 9, 2017 on the left ischial region.  The patient completed 3 months therapy of antibiotic compound on 1/15/2018.  The patient sits from 5 am to 8 pm daily in a wheelchair.  Patient indicates he has not had any additional time of sitting.  On 416/2018, the patient was noted to have increased drainage and odor from his wound.  There was concern about infection and a culture was taken 4/16/2018 revealing methicillin sensitive staph aureus. Patient completed a course of Bactrim for 10 days.   Patient's wound noted to have more drainage and odor on 6/18/2018. Culture was taken to switch to topical antimicrobial to decrease colonization of wound bed. Topical antibiotic was not approved.  Jessie is being utilized.         Current Assessment & Plan     Wound drainage is stable with continued advancement of epithelium.            Other    Cigarette smoker    Current Assessment & Plan     Patient again instructed to stop smoking.         Buttock wound, left, initial encounter    Overview     Patient developed skin tear due to tape recognized on 08/20/2018.  There is no significant drainage associated with the wound. The wound is superficial.               See wound doc progress notes. Documents will be scanned.        Gilmar SHERIDAN  [facility]

## 2018-08-27 ENCOUNTER — CLINICAL SUPPORT (OUTPATIENT)
Dept: SMOKING CESSATION | Facility: CLINIC | Age: 63
End: 2018-08-27
Payer: COMMERCIAL

## 2018-08-27 DIAGNOSIS — F17.210 MODERATE CIGARETTE SMOKER (10-19 PER DAY): Primary | ICD-10-CM

## 2018-08-27 PROCEDURE — 99999 PR PBB SHADOW E&M-EST. PATIENT-LVL I: CPT | Mod: PBBFAC,,,

## 2018-08-27 RX ORDER — DM/P-EPHED/ACETAMINOPH/DOXYLAM 30-7.5/3
2 LIQUID (ML) ORAL
Qty: 270 LOZENGE | Refills: 0 | Status: SHIPPED | OUTPATIENT
Start: 2018-08-27

## 2018-08-27 RX ORDER — IBUPROFEN 200 MG
1 TABLET ORAL DAILY
Qty: 14 PATCH | Refills: 1 | Status: SHIPPED | OUTPATIENT
Start: 2018-08-27 | End: 2018-09-19 | Stop reason: SDUPTHER

## 2018-08-27 NOTE — Clinical Note
The patient is back up to smoking 15 cigarettes/day and has been out of 21 mg nicotine patches and lozenges. Will reorder both with 1 refill on patches. The patient agreed to phone session, cannot get out due to paraplegia. Will try to reduce back down to 5-6 cigarettes. States smokes more because he is bored . Does not leave cigarettes at his side. The patient will continue individual sessions and medication monitoring by CTTS. Prescribed medication management will be by practitoner.The patient denies any abnormal behavioral or mental changes at this time. completion of TCRS (Tobacco Cessation Rating Scale) reviewed strategies, controlling environment, cues, triggers, new goals set. Introduced high risk situations with preparation interventions, caffeine similarities with withdrawal issues of habit and nicotine, Alcohol, Understanding urges, cravings, stress and relaxation.

## 2018-08-27 NOTE — PROGRESS NOTES
Individual Follow-Up Form # 3     8/27/2018    Quit Date: TBD    Clinical Status of Patient: Outpatient    Length of Service: 30 minutes    Continuing Medication: yes  Patches    Other Medications: 4 mg lozenges     Target Symptoms: Withdrawal and medication side effects. The following were  rated moderate (3) to severe (4) on TCRS:  · Moderate (3): desire/crave nicotine withdrawal, habit  · Severe (4): none    Comments: The patient is back up to smoking 15 cigarettes/day and has been out of 21 mg nicotine patches and lozenges. Will reorder both with 1 refill on patches. The patient agreed to phone session, cannot get out due to paraplegia. Will try to reduce back down to 5-6 cigarettes. States smokes more because he is bored . Does not leave cigarettes at his side. The patient will continue individual sessions and medication monitoring by CTTS. Prescribed medication management will be by practitoner.The patient denies any abnormal behavioral or mental changes at this time. completion of TCRS (Tobacco Cessation Rating Scale) reviewed strategies, controlling environment, cues, triggers, new goals set. Introduced high risk situations with preparation interventions, caffeine similarities with withdrawal issues of habit and nicotine, Alcohol, Understanding urges, cravings, stress and relaxation.     Diagnosis: F17.210    Next Visit: 2 weeks

## 2018-09-10 ENCOUNTER — OFFICE VISIT (OUTPATIENT)
Dept: WOUND CARE | Facility: HOSPITAL | Age: 63
End: 2018-09-10
Attending: SURGERY
Payer: MEDICARE

## 2018-09-10 VITALS — DIASTOLIC BLOOD PRESSURE: 78 MMHG | RESPIRATION RATE: 18 BRPM | SYSTOLIC BLOOD PRESSURE: 143 MMHG | HEART RATE: 49 BPM

## 2018-09-10 DIAGNOSIS — L89.324 DECUBITUS ULCER OF LEFT ISCHIUM, STAGE 4: Primary | ICD-10-CM

## 2018-09-10 DIAGNOSIS — G82.20 PARAPLEGIA: ICD-10-CM

## 2018-09-10 DIAGNOSIS — S31.829A BUTTOCK WOUND, LEFT, INITIAL ENCOUNTER: ICD-10-CM

## 2018-09-10 DIAGNOSIS — F17.210 CIGARETTE SMOKER: ICD-10-CM

## 2018-09-10 PROCEDURE — 27201912 HC WOUND CARE DEBRIDEMENT SUPPLIES

## 2018-09-10 PROCEDURE — 99499 UNLISTED E&M SERVICE: CPT | Mod: ,,, | Performed by: SURGERY

## 2018-09-10 PROCEDURE — 11042 DBRDMT SUBQ TIS 1ST 20SQCM/<: CPT

## 2018-09-10 NOTE — PROGRESS NOTES
[facility]  Wound Care  Progress Note    Problem List Items Addressed This Visit        Neuro    Paraplegia       Derm    Decubitus ulcer of left ischium, stage 4 - Primary    Overview     Location: Left ischial region  Duration: Since 11/22/2015  Context: Decubitus ulcer of the left ischial region  Patient developed new wound on the right ischial region noted 1/9/2017. The wound was thought to be related to tubing from the wound vac and pressure. This wound healed  Associated Signs and Symptoms: Patient has no pain. He is insensate   Modifying Factors: The patient continues to smoke despite recommendations to stop. He has made no progress.  The wound vac was Utilized until January 9, 2017 on the left ischial region.  The patient completed 3 months therapy of antibiotic compound on 1/15/2018.  The patient sits from 5 am to 8 pm daily in a wheelchair.  Patient indicates he has not had any additional time of sitting.  On 416/2018, the patient was noted to have increased drainage and odor from his wound.  There was concern about infection and a culture was taken 4/16/2018 revealing methicillin sensitive staph aureus. Patient completed a course of Bactrim for 10 days.   Patient's wound noted to have more drainage and odor on 6/18/2018. Culture was taken to switch to topical antimicrobial to decrease colonization of wound bed. Topical antibiotic was not approved.  Silver alginate is being utilized.            Other    Cigarette smoker    Current Assessment & Plan     Patient instructed to stop smoking         Buttock wound, left, initial encounter    Overview     Patient developed skin tear due to tape recognized on 08/20/2018.  There is no significant drainage associated with the wound. The wound was superficial.  On 9 10/20/2018, the wound is healed               See wound doc progress notes. Documents will be scanned.        Gilmar Christopher  [facility]

## 2018-09-19 ENCOUNTER — CLINICAL SUPPORT (OUTPATIENT)
Dept: SMOKING CESSATION | Facility: CLINIC | Age: 63
End: 2018-09-19
Payer: COMMERCIAL

## 2018-09-19 DIAGNOSIS — F17.210 HEAVY CIGARETTE SMOKER (20-39 PER DAY): Primary | ICD-10-CM

## 2018-09-19 PROCEDURE — 99402 PREV MED CNSL INDIV APPRX 30: CPT | Mod: S$GLB,,,

## 2018-09-19 RX ORDER — IBUPROFEN 200 MG
1 TABLET ORAL DAILY
Qty: 28 PATCH | Refills: 1 | Status: SHIPPED | OUTPATIENT
Start: 2018-09-19

## 2018-09-19 NOTE — PROGRESS NOTES
Individual Follow-Up Form 4    9/19/2018    Quit Date: TBD    Clinical Status of Patient: Outpatient    Length of Service: 30 minutes    Continuing Medication: no    Other Medications: 4 mg nicotine lozenges      Target Symptoms: Withdrawal and medication side effects. The following were  rated moderate (3) to severe (4) on TCRS:  · Moderate (3): Desire/crave habit  · Severe (4): none    Comments: The patient agreed to a phone session: paraplegic and cannot drive. The patient is back up to smoking 30 cigarettes/day. He smokes more when alone due to boredom. He does nor have any activities, but does watch the birds outside. Suggested delay on smoking completion of TCRS (Tobacco Cessation Rating Scale) reviewed strategies, controlling environment, cues, triggers, new goals set. Introduced high risk situations with preparation interventions, caffeine similarities with withdrawal issues of habit and nicotine, Alcohol, Understanding urges, cravings, stress and relaxation.The patient denies any abnormal behavioral or mental changes at this time.The patient denies any abnormal behavioral or mental changes at this time.This is the last visit for trust. Ordered 21 mg nicotine patches. Can resign on 1/19/19. Will be able to do more phone follow-up with patient that were not available before.      Diagnosis: F17.210    Next Visit: End of trust session 10/1/18

## 2018-09-19 NOTE — Clinical Note
he patient agreed to a phone session: paraplegic and cannot drive. The patient is back up to smoking 30 cigarettes/day. He smokes more when alone due to boredom. He does nor have any activities, but does watch the birds outside. Suggested delay on smoking completion of TCRS (Tobacco Cessation Rating Scale) reviewed strategies, controlling environment, cues, triggers, new goals set. Introduced high risk situations with preparation interventions, caffeine similarities with withdrawal issues of habit and nicotine, Alcohol, Understanding urges, cravings, stress and relaxation.The patient denies any abnormal behavioral or mental changes at this time.The patient denies any abnormal behavioral or mental changes at this time.This is the last visit for trust. Ordered 21 mg nicotine patches. Can resign on 1/19/19. Will be able to do more phone follow-up with patient that were not available before.

## 2018-09-24 ENCOUNTER — OFFICE VISIT (OUTPATIENT)
Dept: WOUND CARE | Facility: HOSPITAL | Age: 63
End: 2018-09-24
Attending: SURGERY
Payer: MEDICARE

## 2018-09-24 VITALS — HEART RATE: 61 BPM | RESPIRATION RATE: 18 BRPM | SYSTOLIC BLOOD PRESSURE: 118 MMHG | DIASTOLIC BLOOD PRESSURE: 61 MMHG

## 2018-09-24 DIAGNOSIS — F17.210 CIGARETTE SMOKER: ICD-10-CM

## 2018-09-24 DIAGNOSIS — L89.324 DECUBITUS ULCER OF LEFT ISCHIUM, STAGE 4: ICD-10-CM

## 2018-09-24 DIAGNOSIS — G82.20 PARAPLEGIA: Primary | ICD-10-CM

## 2018-09-24 PROCEDURE — 99499 UNLISTED E&M SERVICE: CPT | Mod: ,,, | Performed by: SURGERY

## 2018-09-24 PROCEDURE — 11042 DBRDMT SUBQ TIS 1ST 20SQCM/<: CPT

## 2018-09-24 PROCEDURE — 27201912 HC WOUND CARE DEBRIDEMENT SUPPLIES

## 2018-09-24 NOTE — PROGRESS NOTES
[facility]  Wound Care  Progress Note    Problem List Items Addressed This Visit        Neuro    Paraplegia - Primary       Derm    Decubitus ulcer of left ischium, stage 4    Overview     Location: Left ischial region  Duration: Since 11/22/2015  Context: Decubitus ulcer of the left ischial region  Patient developed new wound on the right ischial region noted 1/9/2017. The wound was thought to be related to tubing from the wound vac and pressure. This wound healed  Associated Signs and Symptoms: Patient has no pain. He is insensate   Modifying Factors: The patient continues to smoke despite recommendations to stop. He has made no progress.  The wound vac was Utilized until January 9, 2017 on the left ischial region.  The patient completed 3 months therapy of antibiotic compound on 1/15/2018.  The patient sits from 5 am to 8 pm daily in a wheelchair.  Patient indicates he has not had any additional time of sitting.  On 416/2018, the patient was noted to have increased drainage and odor from his wound.  There was concern about infection and a culture was taken 4/16/2018 revealing methicillin sensitive staph aureus. Patient completed a course of Bactrim for 10 days.   Patient's wound noted to have more drainage and odor on 6/18/2018. Culture was taken to switch to topical antimicrobial to decrease colonization of wound bed. Topical antibiotic was not approved.  Silver alginate is being utilized.         Current Assessment & Plan     Slight improvement in the wound.            Other    Cigarette smoker          See wound doc progress notes. Documents will be scanned.        Gilmar Christopher  [facility]

## 2018-10-08 ENCOUNTER — OFFICE VISIT (OUTPATIENT)
Dept: WOUND CARE | Facility: HOSPITAL | Age: 63
End: 2018-10-08
Attending: SURGERY
Payer: MEDICARE

## 2018-10-08 DIAGNOSIS — L89.322 DECUBITUS ULCER OF LEFT BUTTOCK, STAGE 2: ICD-10-CM

## 2018-10-08 DIAGNOSIS — L89.324 DECUBITUS ULCER OF LEFT ISCHIUM, STAGE 4: ICD-10-CM

## 2018-10-08 PROCEDURE — 27201912 HC WOUND CARE DEBRIDEMENT SUPPLIES

## 2018-10-08 PROCEDURE — 99499 UNLISTED E&M SERVICE: CPT | Mod: ,,, | Performed by: SURGERY

## 2018-10-08 PROCEDURE — 87070 CULTURE OTHR SPECIMN AEROBIC: CPT

## 2018-10-08 PROCEDURE — 87077 CULTURE AEROBIC IDENTIFY: CPT

## 2018-10-08 PROCEDURE — 87205 SMEAR GRAM STAIN: CPT

## 2018-10-08 PROCEDURE — 11042 DBRDMT SUBQ TIS 1ST 20SQCM/<: CPT

## 2018-10-08 PROCEDURE — 87186 SC STD MICRODIL/AGAR DIL: CPT

## 2018-10-10 LAB
BACTERIA THROAT CULT: NORMAL
GRAM STN SPEC: NORMAL
GRAM STN SPEC: NORMAL

## 2018-10-22 ENCOUNTER — OFFICE VISIT (OUTPATIENT)
Dept: WOUND CARE | Facility: HOSPITAL | Age: 63
End: 2018-10-22
Attending: SURGERY
Payer: MEDICARE

## 2018-10-22 VITALS — HEART RATE: 63 BPM | SYSTOLIC BLOOD PRESSURE: 95 MMHG | DIASTOLIC BLOOD PRESSURE: 57 MMHG | RESPIRATION RATE: 18 BRPM

## 2018-10-22 DIAGNOSIS — L89.324 DECUBITUS ULCER OF LEFT ISCHIUM, STAGE 4: ICD-10-CM

## 2018-10-22 PROCEDURE — 11042 DBRDMT SUBQ TIS 1ST 20SQCM/<: CPT

## 2018-10-22 PROCEDURE — 27201912 HC WOUND CARE DEBRIDEMENT SUPPLIES

## 2018-10-22 PROCEDURE — 99499 UNLISTED E&M SERVICE: CPT | Mod: ,,, | Performed by: SURGERY

## 2018-10-22 NOTE — PROGRESS NOTES
[facility]  Wound Care  Progress Note    Problem List Items Addressed This Visit     Decubitus ulcer of left ischium, stage 4    Overview     Location: Left ischial region  Duration: Since 11/22/2015  Context: Decubitus ulcer of the left ischial region  Patient developed new wound on the right ischial region noted 1/9/2017. The wound was thought to be related to tubing from the wound vac and pressure. This wound healed  Associated Signs and Symptoms: Patient has no pain. He is insensate   Modifying Factors: The patient continues to smoke despite recommendations to stop. He has made no progress.  The wound vac was Utilized until January 9, 2017 on the left ischial region.  The patient completed 3 months therapy of antibiotic compound on 1/15/2018.  The patient sits from 5 am to 8 pm daily in a wheelchair.  Patient indicates he has not had any additional time of sitting.  On 416/2018, the patient was noted to have increased drainage and odor from his wound.  There was concern about infection and a culture was taken 4/16/2018 revealing methicillin sensitive staph aureus. Patient completed a course of Bactrim for 10 days.   Patient's wound noted to have more drainage and odor on 6/18/2018. Culture was taken to switch to topical antimicrobial to decrease colonization of wound bed. Topical antibiotic was not approved.  Silver alginate is being utilized.  Today patients wound has increased amount of slough and drainage.  There is no foul odor.  There is no exposed bone.  Wound culture was taken and reviewed.  Tissue culture show Staph aureus.  Will implement topical antibiotic dressing. If drainage persists we will consider course of oral antibiotics               See wound doc progress notes. Documents will be scanned.        Manny Vann  [facility]

## 2018-11-05 ENCOUNTER — OFFICE VISIT (OUTPATIENT)
Dept: WOUND CARE | Facility: HOSPITAL | Age: 63
End: 2018-11-05
Attending: SURGERY
Payer: MEDICARE

## 2018-11-05 VITALS — DIASTOLIC BLOOD PRESSURE: 65 MMHG | RESPIRATION RATE: 20 BRPM | HEART RATE: 77 BPM | SYSTOLIC BLOOD PRESSURE: 98 MMHG

## 2018-11-05 DIAGNOSIS — L89.324 DECUBITUS ULCER OF LEFT ISCHIUM, STAGE 4: ICD-10-CM

## 2018-11-05 PROCEDURE — 27201912 HC WOUND CARE DEBRIDEMENT SUPPLIES

## 2018-11-05 PROCEDURE — 11042 DBRDMT SUBQ TIS 1ST 20SQCM/<: CPT

## 2018-11-05 PROCEDURE — 99499 UNLISTED E&M SERVICE: CPT | Mod: ,,, | Performed by: SURGERY

## 2018-11-05 NOTE — PROGRESS NOTES
[facility]  Wound Care  Progress Note    Problem List Items Addressed This Visit     Decubitus ulcer of left ischium, stage 4    Overview     Location: Left ischial region  Duration: Since 11/22/2015  Context: Decubitus ulcer of the left ischial region  Patient developed new wound on the right ischial region noted 1/9/2017. The wound was thought to be related to tubing from the wound vac and pressure. This wound healed  Associated Signs and Symptoms: Patient has no pain. He is insensate   Modifying Factors: The patient continues to smoke despite recommendations to stop. He has made no progress.  The wound vac was Utilized until January 9, 2017 on the left ischial region.  The patient completed 3 months therapy of antibiotic compound on 1/15/2018.  The patient sits from 5 am to 8 pm daily in a wheelchair.  Patient indicates he has not had any additional time of sitting.  On 416/2018, the patient was noted to have increased drainage and odor from his wound.  There was concern about infection and a culture was taken 4/16/2018 revealing methicillin sensitive staph aureus. Patient completed a course of Bactrim for 10 days.   Patient's wound noted to have more drainage and odor on 6/18/2018. Culture was taken to switch to topical antimicrobial to decrease colonization of wound bed. Topical antibiotic was not approved.  Silver alginate is being utilized.  Today patients wound has increased amount of slough and drainage.  There is no foul odor.  There is no exposed bone.  Wound culture was taken and reviewed.  Tissue culture show Staph aureus.  There is decreased drainage today.  The bone remains covered.  There is no sign of soft tissue or bone infection.  Awaiting topical compound.         Relevant Orders    CULTURE, TISSUE (Completed)    Decubitus ulcer of left buttock, stage 2          See wound doc progress notes. Documents will be scanned.        Manny Vann  [facility]

## 2018-11-05 NOTE — PROGRESS NOTES
[facility]  Wound Care  Progress Note    Problem List Items Addressed This Visit     Decubitus ulcer of left ischium, stage 4    Overview     Location: Left ischial region  Duration: Since 11/22/2015  Context: Decubitus ulcer of the left ischial region  Patient developed new wound on the right ischial region noted 1/9/2017. The wound was thought to be related to tubing from the wound vac and pressure. This wound healed  Associated Signs and Symptoms: Patient has no pain. He is insensate   Modifying Factors: The patient continues to smoke despite recommendations to stop. He has made no progress.  The wound vac was Utilized until January 9, 2017 on the left ischial region.  The patient completed 3 months therapy of antibiotic compound on 1/15/2018.  The patient sits from 5 am to 8 pm daily in a wheelchair.  Patient indicates he has not had any additional time of sitting.  On 416/2018, the patient was noted to have increased drainage and odor from his wound.  There was concern about infection and a culture was taken 4/16/2018 revealing methicillin sensitive staph aureus. Patient completed a course of Bactrim for 10 days.   Patient's wound noted to have more drainage and odor on 6/18/2018. Culture was taken to switch to topical antimicrobial to decrease colonization of wound bed. Topical antibiotic was not approved.  Silver alginate is being utilized.  Today patients wound has increased amount of slough and drainage.  There is no foul odor.  There is no exposed bone.  Wound culture was taken and reviewed.  Tissue culture show Staph aureus.  There is decreased drainage today.  The bone remains covered.  There is no sign of soft tissue or bone infection.  Awaiting topical compound.               See wound doc progress notes. Documents will be scanned.        Manny Vann  [facility]

## 2018-11-26 ENCOUNTER — OFFICE VISIT (OUTPATIENT)
Dept: WOUND CARE | Facility: HOSPITAL | Age: 63
End: 2018-11-26
Attending: SURGERY
Payer: MEDICARE

## 2018-11-26 VITALS — DIASTOLIC BLOOD PRESSURE: 70 MMHG | RESPIRATION RATE: 18 BRPM | HEART RATE: 61 BPM | SYSTOLIC BLOOD PRESSURE: 162 MMHG

## 2018-11-26 DIAGNOSIS — L89.324 DECUBITUS ULCER OF LEFT ISCHIUM, STAGE 4: Primary | ICD-10-CM

## 2018-11-26 DIAGNOSIS — F17.210 CIGARETTE SMOKER: ICD-10-CM

## 2018-11-26 DIAGNOSIS — G82.20 PARAPLEGIA: ICD-10-CM

## 2018-11-26 PROBLEM — S31.829A BUTTOCK WOUND, LEFT, INITIAL ENCOUNTER: Status: RESOLVED | Noted: 2018-08-20 | Resolved: 2018-11-26

## 2018-11-26 PROBLEM — L89.322 DECUBITUS ULCER OF LEFT BUTTOCK, STAGE 2: Status: RESOLVED | Noted: 2018-10-08 | Resolved: 2018-11-26

## 2018-11-26 PROCEDURE — 27201912 HC WOUND CARE DEBRIDEMENT SUPPLIES

## 2018-11-26 PROCEDURE — 99499 UNLISTED E&M SERVICE: CPT | Mod: ,,, | Performed by: SURGERY

## 2018-11-26 PROCEDURE — 11042 DBRDMT SUBQ TIS 1ST 20SQCM/<: CPT

## 2018-11-26 PROCEDURE — 11043 DBRDMT MUSC&/FSCA 1ST 20/<: CPT

## 2018-11-26 NOTE — ASSESSMENT & PLAN NOTE
Slow wound improvement.  Continue debridement to maintain active phase of wound healing.  Offload.

## 2018-11-26 NOTE — PROGRESS NOTES
[facility]  Wound Care  Progress Note    Problem List Items Addressed This Visit        Neuro    Paraplegia       Derm    Decubitus ulcer of left ischium, stage 4 - Primary    Overview     Location: Left ischial region  Duration: Since 11/22/2015  Context: Decubitus ulcer of the left ischial region  Patient developed new wound on the right ischial region noted 1/9/2017. The wound was thought to be related to tubing from the wound vac and pressure. This wound healed  Associated Signs and Symptoms: Patient has no pain. He is insensate   Modifying Factors: The patient continues to smoke despite recommendations to stop. He has made no progress.  The wound vac was Utilized until January 9, 2017 on the left ischial region.  The patient completed 3 months therapy of antibiotic compound on 1/15/2018.  The patient sits from 5 am to 8 pm daily in a wheelchair.  Patient indicates he has not had any additional time of sitting.  On 416/2018, the patient was noted to have increased drainage and odor from his wound.  There was concern about infection and a culture was taken 4/16/2018 revealing methicillin sensitive staph aureus. Patient completed a course of Bactrim for 10 days.   Patient's wound noted to have more drainage and odor on 6/18/2018. Culture was taken to switch to topical antimicrobial to decrease colonization of wound bed. Topical antibiotic was not approved.  Silver alginate is being utilized.           Current Assessment & Plan     Slow wound improvement.  Continue debridement to maintain active phase of wound healing.  Offload.            Other    Cigarette smoker    Current Assessment & Plan     Patient instructed to stop smoking.               See wound doc progress notes. Documents will be scanned.        Gilmar Christopher  [facility]

## 2018-12-10 ENCOUNTER — OFFICE VISIT (OUTPATIENT)
Dept: WOUND CARE | Facility: HOSPITAL | Age: 63
End: 2018-12-10
Attending: SURGERY
Payer: MEDICARE

## 2018-12-10 VITALS — SYSTOLIC BLOOD PRESSURE: 95 MMHG | DIASTOLIC BLOOD PRESSURE: 60 MMHG | HEART RATE: 59 BPM | RESPIRATION RATE: 18 BRPM

## 2018-12-10 DIAGNOSIS — L89.324 DECUBITUS ULCER OF LEFT ISCHIUM, STAGE 4: ICD-10-CM

## 2018-12-10 DIAGNOSIS — G82.20 PARAPLEGIA: ICD-10-CM

## 2018-12-10 DIAGNOSIS — F17.210 CIGARETTE SMOKER: Primary | ICD-10-CM

## 2018-12-10 PROCEDURE — 11042 DBRDMT SUBQ TIS 1ST 20SQCM/<: CPT

## 2018-12-10 PROCEDURE — 27201912 HC WOUND CARE DEBRIDEMENT SUPPLIES

## 2018-12-10 PROCEDURE — 99499 UNLISTED E&M SERVICE: CPT | Mod: ,,, | Performed by: SURGERY

## 2018-12-10 NOTE — PROGRESS NOTES
[facility]  Wound Care  Progress Note    Problem List Items Addressed This Visit        Neuro    Paraplegia       Derm    Decubitus ulcer of left ischium, stage 4    Overview     Location: Left ischial region  Duration: Since 11/22/2015  Context: Decubitus ulcer of the left ischial region  Patient developed new wound on the right ischial region noted 1/9/2017. The wound was thought to be related to tubing from the wound vac and pressure. This wound healed  Associated Signs and Symptoms: Patient has no pain. He is insensate   Modifying Factors: The patient continues to smoke despite recommendations to stop. He has made no progress.  The wound vac was Utilized until January 9, 2017 on the left ischial region.  The patient completed 3 months therapy of antibiotic compound on 1/15/2018.  The patient sits from 5 am to 8 pm daily in a wheelchair.  Patient indicates he has not had any additional time of sitting.  On 416/2018, the patient was noted to have increased drainage and odor from his wound.  There was concern about infection and a culture was taken 4/16/2018 revealing methicillin sensitive staph aureus. Patient completed a course of Bactrim for 10 days.   Patient's wound noted to have more drainage and odor on 6/18/2018. Culture was taken to switch to topical antimicrobial to decrease colonization of wound bed. Topical antibiotic was not approved.  Repeat DNA analysis was performed of wound exudate and as of 12/10/2018, topical antibiotic is being utilized.         Current Assessment & Plan     There has been drying of the wound with topical antibiotic therapy.  There has been good skin ingrowth across the wound.  Continue debridement to maintain active phase wound healing.  Continue offloading.  Continue topical antibiotic therapy            Other    Cigarette smoker - Primary    Current Assessment & Plan     Patient is instructed to stop smoking               See wound doc progress notes.  Documents will be scanned.        Gilmar Christopher  [facility]

## 2018-12-10 NOTE — ASSESSMENT & PLAN NOTE
There has been drying of the wound with topical antibiotic therapy.  There has been good skin ingrowth across the wound.  Continue debridement to maintain active phase wound healing.  Continue offloading.  Continue topical antibiotic therapy

## 2019-01-07 ENCOUNTER — OFFICE VISIT (OUTPATIENT)
Dept: WOUND CARE | Facility: HOSPITAL | Age: 64
End: 2019-01-07
Attending: SURGERY
Payer: COMMERCIAL

## 2019-01-07 DIAGNOSIS — L89.324 DECUBITUS ULCER OF LEFT ISCHIUM, STAGE 4: ICD-10-CM

## 2019-01-07 PROCEDURE — 27201912 HC WOUND CARE DEBRIDEMENT SUPPLIES

## 2019-01-07 PROCEDURE — 11042 DBRDMT SUBQ TIS 1ST 20SQCM/<: CPT

## 2019-01-07 PROCEDURE — 99499 UNLISTED E&M SERVICE: CPT | Mod: ,,, | Performed by: SURGERY

## 2019-01-07 PROCEDURE — 99499 NO LOS: ICD-10-PCS | Mod: ,,, | Performed by: SURGERY

## 2019-01-07 NOTE — PROGRESS NOTES
[facility]  Wound Care  Progress Note    Problem List Items Addressed This Visit     Decubitus ulcer of left ischium, stage 4    Overview     Location: Left ischial region  Duration: Since 11/22/2015  Context: Decubitus ulcer of the left ischial region  Patient developed new wound on the right ischial region noted 1/9/2017. The wound was thought to be related to tubing from the wound vac and pressure. This wound healed  Associated Signs and Symptoms: Patient has no pain. He is insensate   Modifying Factors: The patient continues to smoke despite recommendations to stop. He has made no progress.  The wound vac was Utilized until January 9, 2017 on the left ischial region.  The patient completed 3 months therapy of antibiotic compound on 1/15/2018.  The patient sits from 5 am to 8 pm daily in a wheelchair.  Patient indicates he has not had any additional time of sitting.  On 416/2018, the patient was noted to have increased drainage and odor from his wound.  There was concern about infection and a culture was taken 4/16/2018 revealing methicillin sensitive staph aureus. Patient completed a course of Bactrim for 10 days.   Patient's wound noted to have more drainage and odor on 6/18/2018. Culture was taken to switch to topical antimicrobial to decrease colonization of wound bed. Topical antibiotic was not approved.  Repeat DNA analysis was performed of wound exudate and as of 12/10/2018, topical antibiotic is being utilized.  Bone remains covered.  There does not appear to be any soft tissue infection.  Will add drawteck to topical antibiotic               See wound doc progress notes. Documents will be scanned.        Manny Vann  [facility]

## 2019-01-21 ENCOUNTER — OFFICE VISIT (OUTPATIENT)
Dept: WOUND CARE | Facility: HOSPITAL | Age: 64
End: 2019-01-21
Attending: SURGERY
Payer: COMMERCIAL

## 2019-01-21 VITALS
RESPIRATION RATE: 18 BRPM | DIASTOLIC BLOOD PRESSURE: 78 MMHG | HEART RATE: 87 BPM | TEMPERATURE: 98 F | SYSTOLIC BLOOD PRESSURE: 142 MMHG

## 2019-01-21 DIAGNOSIS — L89.324 DECUBITUS ULCER OF LEFT ISCHIUM, STAGE 4: ICD-10-CM

## 2019-01-21 PROCEDURE — 99499 UNLISTED E&M SERVICE: CPT | Mod: ,,, | Performed by: SURGERY

## 2019-01-21 PROCEDURE — 11042 DBRDMT SUBQ TIS 1ST 20SQCM/<: CPT

## 2019-01-21 PROCEDURE — 27201912 HC WOUND CARE DEBRIDEMENT SUPPLIES

## 2019-01-21 PROCEDURE — 99499 NO LOS: ICD-10-PCS | Mod: ,,, | Performed by: SURGERY

## 2019-01-21 NOTE — PROGRESS NOTES
[facility]  Wound Care  Progress Note    Problem List Items Addressed This Visit     Decubitus ulcer of left ischium, stage 4    Overview     Location: Left ischial region  Duration: Since 11/22/2015  Context: Decubitus ulcer of the left ischial region  Patient developed new wound on the right ischial region noted 1/9/2017. The wound was thought to be related to tubing from the wound vac and pressure. This wound healed  Associated Signs and Symptoms: Patient has no pain. He is insensate   Modifying Factors: The patient continues to smoke despite recommendations to stop. He has made no progress.  The wound vac was Utilized until January 9, 2017 on the left ischial region.  The patient completed 3 months therapy of antibiotic compound on 1/15/2018.  The patient sits from 5 am to 8 pm daily in a wheelchair.  Patient indicates he has not had any additional time of sitting.  On 416/2018, the patient was noted to have increased drainage and odor from his wound.  There was concern about infection and a culture was taken 4/16/2018 revealing methicillin sensitive staph aureus. Patient completed a course of Bactrim for 10 days.   Patient's wound noted to have more drainage and odor on 6/18/2018. Culture was taken to switch to topical antimicrobial to decrease colonization of wound bed. Topical antibiotic was not approved.  Repeat DNA analysis was performed of wound exudate and as of 12/10/2018, topical antibiotic is being utilized.  Bone remains covered.  Patient presents today with increased odor and drainage from the wound.  The granulation tissue is very friable.  Will change to Dakin's solution.  Repeat the NERI culture done today.               See wound doc progress notes. Documents will be scanned.        Manny Vann  [facility]

## 2019-01-28 ENCOUNTER — TELEPHONE (OUTPATIENT)
Dept: SMOKING CESSATION | Facility: CLINIC | Age: 64
End: 2019-01-28

## 2019-01-28 ENCOUNTER — CLINICAL SUPPORT (OUTPATIENT)
Dept: SMOKING CESSATION | Facility: CLINIC | Age: 64
End: 2019-01-28
Payer: COMMERCIAL

## 2019-01-28 DIAGNOSIS — F17.200 NICOTINE DEPENDENCE: Primary | ICD-10-CM

## 2019-01-28 PROCEDURE — 99407 BEHAV CHNG SMOKING > 10 MIN: CPT | Mod: S$GLB,,,

## 2019-01-28 PROCEDURE — 99407 PR TOBACCO USE CESSATION INTENSIVE >10 MINUTES: ICD-10-PCS | Mod: S$GLB,,,

## 2019-01-28 NOTE — TELEPHONE ENCOUNTER
1st attempt unable to leave message regarding smoking cessation quit 3 episode 12 month phone follow up.

## 2019-01-28 NOTE — PROGRESS NOTES
Spoke with patient today in regards to smoking cessation progress for 12 month follow up,  states not tobacco free at this time. Patient has scheduled an appointment for Quit #4. Informed patient of benefit period, future follow ups, and contact information. Will resolve episode and complete smart form for Quit attempt #3.

## 2019-02-04 ENCOUNTER — OFFICE VISIT (OUTPATIENT)
Dept: WOUND CARE | Facility: HOSPITAL | Age: 64
End: 2019-02-04
Attending: SURGERY
Payer: COMMERCIAL

## 2019-02-04 VITALS — SYSTOLIC BLOOD PRESSURE: 166 MMHG | DIASTOLIC BLOOD PRESSURE: 63 MMHG | HEART RATE: 59 BPM

## 2019-02-04 DIAGNOSIS — L89.324 DECUBITUS ULCER OF LEFT ISCHIUM, STAGE 4: ICD-10-CM

## 2019-02-04 PROCEDURE — 27201912 HC WOUND CARE DEBRIDEMENT SUPPLIES

## 2019-02-04 PROCEDURE — 99499 UNLISTED E&M SERVICE: CPT | Mod: ,,, | Performed by: SURGERY

## 2019-02-04 PROCEDURE — 99499 NO LOS: ICD-10-PCS | Mod: ,,, | Performed by: SURGERY

## 2019-02-04 PROCEDURE — 11042 DBRDMT SUBQ TIS 1ST 20SQCM/<: CPT

## 2019-02-04 NOTE — PROGRESS NOTES
[facility]  Wound Care  Progress Note    Problem List Items Addressed This Visit     Decubitus ulcer of left ischium, stage 4    Overview     Location: Left ischial region  Duration: Since 11/22/2015  Context: Decubitus ulcer of the left ischial region  Patient developed new wound on the right ischial region noted 1/9/2017. The wound was thought to be related to tubing from the wound vac and pressure. This wound healed  Associated Signs and Symptoms: Patient has no pain. He is insensate   Modifying Factors: The patient continues to smoke despite recommendations to stop. He has made no progress.  The wound vac was Utilized until January 9, 2017 on the left ischial region.  The patient completed 3 months therapy of antibiotic compound on 1/15/2018.  The patient sits from 5 am to 8 pm daily in a wheelchair.  Patient indicates he has not had any additional time of sitting.  On 416/2018, the patient was noted to have increased drainage and odor from his wound.  There was concern about infection and a culture was taken 4/16/2018 revealing methicillin sensitive staph aureus. Patient completed a course of Bactrim for 10 days.   Patient's wound noted to have more drainage and odor on 6/18/2018. Culture was taken to switch to topical antimicrobial to decrease colonization of wound bed. Topical antibiotic was not approved.  Repeat DNA analysis was performed of wound exudate and as of 12/10/2018.  We are current using Dakin solution. We are awaiting report approval for topical antibiotic.  The wound is improved.  There is less friable granulation tissue and drainage. There is no sign of soft tissue infection.               See wound doc progress notes. Documents will be scanned.        Manny Vann  [facility]

## 2019-02-11 ENCOUNTER — TELEPHONE (OUTPATIENT)
Dept: SMOKING CESSATION | Facility: CLINIC | Age: 64
End: 2019-02-11

## 2019-02-11 NOTE — TELEPHONE ENCOUNTER
Unsuccessful contact with patient regarding missed intake appointment. Unable to leave message due to full mailbox.

## 2019-02-18 ENCOUNTER — OFFICE VISIT (OUTPATIENT)
Dept: WOUND CARE | Facility: HOSPITAL | Age: 64
End: 2019-02-18
Attending: SURGERY
Payer: COMMERCIAL

## 2019-02-18 VITALS — SYSTOLIC BLOOD PRESSURE: 122 MMHG | RESPIRATION RATE: 19 BRPM | HEART RATE: 56 BPM | DIASTOLIC BLOOD PRESSURE: 66 MMHG

## 2019-02-18 DIAGNOSIS — L89.324 DECUBITUS ULCER OF LEFT ISCHIUM, STAGE 4: ICD-10-CM

## 2019-02-18 PROCEDURE — 99499 UNLISTED E&M SERVICE: CPT | Mod: ,,, | Performed by: SURGERY

## 2019-02-18 PROCEDURE — 99499 NO LOS: ICD-10-PCS | Mod: ,,, | Performed by: SURGERY

## 2019-02-18 PROCEDURE — 27201912 HC WOUND CARE DEBRIDEMENT SUPPLIES

## 2019-02-18 PROCEDURE — 11042 DBRDMT SUBQ TIS 1ST 20SQCM/<: CPT

## 2019-02-18 NOTE — PROGRESS NOTES
[facility]  Wound Care  Progress Note    Problem List Items Addressed This Visit     Decubitus ulcer of left ischium, stage 4    Overview     Location: Left ischial region  Duration: Since 11/22/2015  Context: Decubitus ulcer of the left ischial region  Patient developed new wound on the right ischial region noted 1/9/2017. The wound was thought to be related to tubing from the wound vac and pressure. This wound healed  Associated Signs and Symptoms: Patient has no pain. He is insensate   Modifying Factors: The patient continues to smoke despite recommendations to stop. He has made no progress.  The wound vac was Utilized until January 9, 2017 on the left ischial region.  The patient completed 3 months therapy of antibiotic compound on 1/15/2018.  The patient sits from 5 am to 8 pm daily in a wheelchair.  Patient indicates he has not had any additional time of sitting.  On 416/2018, the patient was noted to have increased drainage and odor from his wound.  There was concern about infection and a culture was taken 4/16/2018 revealing methicillin sensitive staph aureus. Patient completed a course of Bactrim for 10 days.   Patient's wound noted to have more drainage and odor on 6/18/2018. Culture was taken to switch to topical antimicrobial to decrease colonization of wound bed. Topical antibiotic was not approved.  Repeat DNA analysis was performed of wound exudate and as of 12/10/2018.  Patient is now receiving topical antibiotic.  Wound is improved.  There is decreased drainage.  There is no friable granulation tissue.  The bone is not exposed.  There is no sign of soft tissue infection.               See wound doc progress notes. Documents will be scanned.        Manny Vann  [facility]

## 2019-03-11 ENCOUNTER — OFFICE VISIT (OUTPATIENT)
Dept: WOUND CARE | Facility: HOSPITAL | Age: 64
End: 2019-03-11
Attending: SURGERY
Payer: COMMERCIAL

## 2019-03-11 VITALS — RESPIRATION RATE: 20 BRPM | SYSTOLIC BLOOD PRESSURE: 97 MMHG | DIASTOLIC BLOOD PRESSURE: 61 MMHG | HEART RATE: 64 BPM

## 2019-03-11 DIAGNOSIS — G82.20 PARAPLEGIA: ICD-10-CM

## 2019-03-11 DIAGNOSIS — L89.324 DECUBITUS ULCER OF LEFT ISCHIUM, STAGE 4: Primary | ICD-10-CM

## 2019-03-11 PROCEDURE — 11042 DBRDMT SUBQ TIS 1ST 20SQCM/<: CPT

## 2019-03-11 PROCEDURE — 99499 NO LOS: ICD-10-PCS | Mod: ,,, | Performed by: SURGERY

## 2019-03-11 PROCEDURE — 27201912 HC WOUND CARE DEBRIDEMENT SUPPLIES

## 2019-03-11 PROCEDURE — 99499 UNLISTED E&M SERVICE: CPT | Mod: ,,, | Performed by: SURGERY

## 2019-03-11 NOTE — ASSESSMENT & PLAN NOTE
Wound is improved with significant advancement of epithelium across the wound.  Continue offloading.  Continue antibiotic compound.  Continue debridement maintain active phase wound healing.

## 2019-03-11 NOTE — PROGRESS NOTES
[facility]  Wound Care  Progress Note    Problem List Items Addressed This Visit        Neuro    Paraplegia       Derm    Decubitus ulcer of left ischium, stage 4 - Primary    Overview     Location: Left ischial region  Duration: Since 11/22/2015  Context: Decubitus ulcer of the left ischial region  Patient developed new wound on the right ischial region noted 1/9/2017. The wound was thought to be related to tubing from the wound vac and pressure. This wound healed  Associated Signs and Symptoms: Patient has no pain. He is insensate   Modifying Factors: The patient continues to smoke despite recommendations to stop. He has made no progress.  The wound vac was Utilized until January 9, 2017 on the left ischial region.  The patient completed 3 months therapy of antibiotic compound on 1/15/2018.  The patient sits from 5 am to 8 pm daily in a wheelchair.  Patient indicates he has not had any additional time of sitting.  On 416/2018, the patient was noted to have increased drainage and odor from his wound.  There was concern about infection and a culture was taken 4/16/2018 revealing methicillin sensitive staph aureus. Patient completed a course of Bactrim for 10 days.   Patient's wound noted to have more drainage and odor on 6/18/2018. Culture was taken to switch to topical antimicrobial to decrease colonization of wound bed. Topical antibiotic was not approved.  Repeat DNA analysis was performed of wound exudate and as of 12/10/2018.  Patient is now receiving topical antibiotic.           Current Assessment & Plan     Wound is improved with significant advancement of epithelium across the wound.  Continue offloading.  Continue antibiotic compound.  Continue debridement maintain active phase wound healing.               See wound doc progress notes. Documents will be scanned.        Gilmar Christopher  [facility]

## 2019-03-13 ENCOUNTER — TELEPHONE (OUTPATIENT)
Dept: SMOKING CESSATION | Facility: CLINIC | Age: 64
End: 2019-03-13

## 2019-03-13 ENCOUNTER — CLINICAL SUPPORT (OUTPATIENT)
Dept: SMOKING CESSATION | Facility: CLINIC | Age: 64
End: 2019-03-13
Payer: COMMERCIAL

## 2019-03-13 DIAGNOSIS — F17.200 NICOTINE DEPENDENCE: Primary | ICD-10-CM

## 2019-03-13 PROCEDURE — 99406 BEHAV CHNG SMOKING 3-10 MIN: CPT | Mod: S$GLB,,,

## 2019-03-13 PROCEDURE — 99406 PR TOBACCO USE CESSATION INTERMEDIATE 3-10 MINUTES: ICD-10-PCS | Mod: S$GLB,,,

## 2019-03-13 NOTE — TELEPHONE ENCOUNTER
Unsuccessful contact with patient regarding missed appointment on [id number]. Unable to leave message, because the mailbox was full.

## 2019-03-25 ENCOUNTER — OFFICE VISIT (OUTPATIENT)
Dept: WOUND CARE | Facility: HOSPITAL | Age: 64
End: 2019-03-25
Attending: SURGERY
Payer: COMMERCIAL

## 2019-03-25 VITALS — SYSTOLIC BLOOD PRESSURE: 141 MMHG | DIASTOLIC BLOOD PRESSURE: 63 MMHG | HEART RATE: 60 BPM

## 2019-03-25 DIAGNOSIS — L89.324 DECUBITUS ULCER OF LEFT ISCHIUM, STAGE 4: Primary | ICD-10-CM

## 2019-03-25 DIAGNOSIS — F17.210 CIGARETTE SMOKER: ICD-10-CM

## 2019-03-25 DIAGNOSIS — G82.20 PARAPLEGIA: ICD-10-CM

## 2019-03-25 PROCEDURE — 11042 DBRDMT SUBQ TIS 1ST 20SQCM/<: CPT

## 2019-03-25 PROCEDURE — 99499 UNLISTED E&M SERVICE: CPT | Mod: ,,, | Performed by: SURGERY

## 2019-03-25 PROCEDURE — 99499 NO LOS: ICD-10-PCS | Mod: ,,, | Performed by: SURGERY

## 2019-03-25 PROCEDURE — 27201912 HC WOUND CARE DEBRIDEMENT SUPPLIES

## 2019-03-25 NOTE — PROGRESS NOTES
[facility]  Wound Care  Progress Note    Problem List Items Addressed This Visit        Neuro    Paraplegia       Derm    Decubitus ulcer of left ischium, stage 4 - Primary    Overview     Location: Left ischial region  Duration: Since 11/22/2015  Context: Decubitus ulcer of the left ischial region  Patient developed new wound on the right ischial region noted 1/9/2017. The wound was thought to be related to tubing from the wound vac and pressure. This wound healed  Associated Signs and Symptoms: Patient has no pain. He is insensate   Modifying Factors: The patient continues to smoke despite recommendations to stop. He has made no progress.  The wound vac was Utilized until January 9, 2017 on the left ischial region.  The patient completed 3 months therapy of antibiotic compound on 1/15/2018.  The patient sits from 5 am to 8 pm daily in a wheelchair.  Patient indicates he has not had any additional time of sitting.  On 416/2018, the patient was noted to have increased drainage and odor from his wound.  There was concern about infection and a culture was taken 4/16/2018 revealing methicillin sensitive staph aureus. Patient completed a course of Bactrim for 10 days.   Patient's wound noted to have more drainage and odor on 6/18/2018. Culture was taken to switch to topical antimicrobial to decrease colonization of wound bed. Topical antibiotic was not approved.  Repeat DNA analysis was performed of wound exudate and as of 12/10/2018.  Patient is now receiving topical antibiotic.           Current Assessment & Plan     There continues to be advancing of epithelialization.  The wound is overall drier.  Continue debridement to maintain active phase wound healing.  Continue antibiotic compound.  Continue offloading.            Other    Cigarette smoker    Current Assessment & Plan     Patient continues to be instructed to stop smoking.               See wound doc progress notes. Documents will be  scanned.        Gilmar SHERIDAN Christopher  [facility]

## 2019-03-25 NOTE — ASSESSMENT & PLAN NOTE
There continues to be advancing of epithelialization.  The wound is overall drier.  Continue debridement to maintain active phase wound healing.  Continue antibiotic compound.  Continue offloading.

## 2019-04-08 ENCOUNTER — OFFICE VISIT (OUTPATIENT)
Dept: WOUND CARE | Facility: HOSPITAL | Age: 64
End: 2019-04-08
Attending: SURGERY
Payer: MEDICARE

## 2019-04-08 VITALS — DIASTOLIC BLOOD PRESSURE: 66 MMHG | SYSTOLIC BLOOD PRESSURE: 139 MMHG | HEART RATE: 62 BPM | RESPIRATION RATE: 18 BRPM

## 2019-04-08 DIAGNOSIS — G82.20 PARAPLEGIA: ICD-10-CM

## 2019-04-08 DIAGNOSIS — L89.324 DECUBITUS ULCER OF LEFT ISCHIUM, STAGE 4: Primary | ICD-10-CM

## 2019-04-08 DIAGNOSIS — F17.210 CIGARETTE SMOKER: ICD-10-CM

## 2019-04-08 PROCEDURE — 11042 DBRDMT SUBQ TIS 1ST 20SQCM/<: CPT

## 2019-04-08 PROCEDURE — 27201912 HC WOUND CARE DEBRIDEMENT SUPPLIES

## 2019-04-08 PROCEDURE — 99499 NO LOS: ICD-10-PCS | Mod: ,,, | Performed by: SURGERY

## 2019-04-08 PROCEDURE — 99499 UNLISTED E&M SERVICE: CPT | Mod: ,,, | Performed by: SURGERY

## 2019-04-08 NOTE — ASSESSMENT & PLAN NOTE
Patient continues with good epithelial advancement.  Wound remains dry with good control of back today.  Continue offloading.  Continue debridement to maintain active phase wound healing.  Continue topical antibiotic therapy.

## 2019-04-08 NOTE — PROGRESS NOTES
[facility]  Wound Care  Progress Note    Problem List Items Addressed This Visit        Neuro    Paraplegia       Derm    Decubitus ulcer of left ischium, stage 4 - Primary    Overview     Location: Left ischial region  Duration: Since 11/22/2015  Context: Decubitus ulcer of the left ischial region  Patient developed new wound on the right ischial region noted 1/9/2017. The wound was thought to be related to tubing from the wound vac and pressure. This wound healed  Associated Signs and Symptoms: Patient has no pain. He is insensate   Modifying Factors: The patient continues to smoke despite recommendations to stop. He has made no progress.  The wound vac was Utilized until January 9, 2017 on the left ischial region.  The patient completed 3 months therapy of antibiotic compound on 1/15/2018.  The patient sits from 5 am to 8 pm daily in a wheelchair.  Patient indicates he has not had any additional time of sitting.  On 416/2018, the patient was noted to have increased drainage and odor from his wound.  There was concern about infection and a culture was taken 4/16/2018 revealing methicillin sensitive staph aureus. Patient completed a course of Bactrim for 10 days.   Patient's wound noted to have more drainage and odor on 6/18/2018. Culture was taken to switch to topical antimicrobial to decrease colonization of wound bed. Topical antibiotic was not approved.  Repeat DNA analysis was performed of wound exudate and as of 12/10/2018.  Patient is now receiving topical antibiotic.           Current Assessment & Plan     Patient continues with good epithelial advancement.  Wound remains dry with good control of back today.  Continue offloading.  Continue debridement to maintain active phase wound healing.  Continue topical antibiotic therapy.            Other    Cigarette smoker    Current Assessment & Plan     Patient instructed to stop smoking               See wound doc progress notes. Documents  will be scanned.        Gilmar SHERIDAN Christopher  [facility]

## 2019-04-22 ENCOUNTER — OFFICE VISIT (OUTPATIENT)
Dept: WOUND CARE | Facility: HOSPITAL | Age: 64
End: 2019-04-22
Attending: SURGERY
Payer: MEDICARE

## 2019-04-22 VITALS — HEART RATE: 78 BPM | DIASTOLIC BLOOD PRESSURE: 77 MMHG | SYSTOLIC BLOOD PRESSURE: 126 MMHG | RESPIRATION RATE: 20 BRPM

## 2019-04-22 DIAGNOSIS — G82.20 PARAPLEGIA: ICD-10-CM

## 2019-04-22 DIAGNOSIS — T22.232A PARTIAL THICKNESS BURN OF LEFT UPPER ARM, INITIAL ENCOUNTER: ICD-10-CM

## 2019-04-22 DIAGNOSIS — F17.210 CIGARETTE SMOKER: ICD-10-CM

## 2019-04-22 DIAGNOSIS — L89.324 DECUBITUS ULCER OF LEFT ISCHIUM, STAGE 4: Primary | ICD-10-CM

## 2019-04-22 PROCEDURE — 11042 DBRDMT SUBQ TIS 1ST 20SQCM/<: CPT

## 2019-04-22 PROCEDURE — 27201912 HC WOUND CARE DEBRIDEMENT SUPPLIES

## 2019-04-22 PROCEDURE — 99499 NO LOS: ICD-10-PCS | Mod: ,,, | Performed by: SURGERY

## 2019-04-22 PROCEDURE — 99499 UNLISTED E&M SERVICE: CPT | Mod: ,,, | Performed by: SURGERY
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
## 2019-04-22 NOTE — ASSESSMENT & PLAN NOTE
Patient wound continues to have advancing epithelium.  Continue debridement to maintain active phase of wound healing.  Once his current supply of topical antibiotic is exhausted, will switch to silver alginate.  If her problem recurs then re-culture..
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
## 2019-05-06 ENCOUNTER — OFFICE VISIT (OUTPATIENT)
Dept: WOUND CARE | Facility: HOSPITAL | Age: 64
End: 2019-05-06
Attending: SURGERY
Payer: COMMERCIAL

## 2019-05-06 VITALS — SYSTOLIC BLOOD PRESSURE: 136 MMHG | DIASTOLIC BLOOD PRESSURE: 84 MMHG | RESPIRATION RATE: 20 BRPM | HEART RATE: 77 BPM

## 2019-05-06 DIAGNOSIS — L89.324 DECUBITUS ULCER OF LEFT ISCHIUM, STAGE 4: Primary | ICD-10-CM

## 2019-05-06 DIAGNOSIS — G82.20 PARAPLEGIA: ICD-10-CM

## 2019-05-06 DIAGNOSIS — T22.232A PARTIAL THICKNESS BURN OF LEFT UPPER ARM, INITIAL ENCOUNTER: ICD-10-CM

## 2019-05-06 DIAGNOSIS — F17.210 CIGARETTE SMOKER: ICD-10-CM

## 2019-05-06 PROCEDURE — 16020 DRESS/DEBRID P-THICK BURN S: CPT

## 2019-05-06 PROCEDURE — 11042 DBRDMT SUBQ TIS 1ST 20SQCM/<: CPT

## 2019-05-06 PROCEDURE — 99499 UNLISTED E&M SERVICE: CPT | Mod: ,,, | Performed by: SURGERY

## 2019-05-06 PROCEDURE — 27201912 HC WOUND CARE DEBRIDEMENT SUPPLIES

## 2019-05-06 PROCEDURE — 97597 DBRDMT OPN WND 1ST 20 CM/<: CPT

## 2019-05-06 PROCEDURE — 99499 NO LOS: ICD-10-PCS | Mod: ,,, | Performed by: SURGERY

## 2019-05-06 PROCEDURE — 97598 DBRDMT OPN WND ADDL 20CM/<: CPT

## 2019-05-06 NOTE — ASSESSMENT & PLAN NOTE
Wound is improving.  Necrotic tissue was being removed.  Initiate Santyl.  Sharp debridement to remove nonviable tissue.

## 2019-05-06 NOTE — ASSESSMENT & PLAN NOTE
Wound continues to make progress.  Continue debridement to maintain active phase wound healing.  Continue offloading.
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
## 2019-05-20 ENCOUNTER — OFFICE VISIT (OUTPATIENT)
Dept: WOUND CARE | Facility: HOSPITAL | Age: 64
End: 2019-05-20
Attending: SURGERY
Payer: COMMERCIAL

## 2019-05-20 VITALS — RESPIRATION RATE: 18 BRPM | SYSTOLIC BLOOD PRESSURE: 138 MMHG | DIASTOLIC BLOOD PRESSURE: 73 MMHG | HEART RATE: 61 BPM

## 2019-05-20 DIAGNOSIS — T22.232A PARTIAL THICKNESS BURN OF LEFT UPPER ARM, INITIAL ENCOUNTER: ICD-10-CM

## 2019-05-20 DIAGNOSIS — G82.20 PARAPLEGIA: ICD-10-CM

## 2019-05-20 DIAGNOSIS — L89.324 DECUBITUS ULCER OF LEFT ISCHIUM, STAGE 4: Primary | ICD-10-CM

## 2019-05-20 PROCEDURE — 99499 UNLISTED E&M SERVICE: CPT | Mod: ,,, | Performed by: SURGERY

## 2019-05-20 PROCEDURE — 99499 NO LOS: ICD-10-PCS | Mod: ,,, | Performed by: SURGERY

## 2019-05-20 PROCEDURE — 11042 DBRDMT SUBQ TIS 1ST 20SQCM/<: CPT

## 2019-05-20 PROCEDURE — 16020 DRESS/DEBRID P-THICK BURN S: CPT

## 2019-05-20 PROCEDURE — 27201912 HC WOUND CARE DEBRIDEMENT SUPPLIES
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
## 2019-05-20 NOTE — ASSESSMENT & PLAN NOTE
Wound doing extremely well. There continues to be advancement of epithelium.  Continue offloading.  Continue debridement to maintain active phase wound healing.  Continue silver alginate.  Patient at this point probably only needs monthly visits.

## 2019-06-10 ENCOUNTER — OFFICE VISIT (OUTPATIENT)
Dept: WOUND CARE | Facility: HOSPITAL | Age: 64
End: 2019-06-10
Attending: SURGERY
Payer: COMMERCIAL

## 2019-06-10 VITALS — HEART RATE: 78 BPM | DIASTOLIC BLOOD PRESSURE: 92 MMHG | RESPIRATION RATE: 18 BRPM | SYSTOLIC BLOOD PRESSURE: 180 MMHG

## 2019-06-10 DIAGNOSIS — L97.912 CHRONIC ULCER OF RIGHT LOWER EXTREMITY WITH FAT LAYER EXPOSED: ICD-10-CM

## 2019-06-10 DIAGNOSIS — L97.921: ICD-10-CM

## 2019-06-10 DIAGNOSIS — L98.491 NON-PRESSURE CHRONIC ULCER OF SKIN OF OTHER SITES LIMITED TO BREAKDOWN OF SKIN: ICD-10-CM

## 2019-06-10 DIAGNOSIS — G82.20 PARAPLEGIA: Primary | ICD-10-CM

## 2019-06-10 DIAGNOSIS — L89.324 DECUBITUS ULCER OF LEFT ISCHIUM, STAGE 4: ICD-10-CM

## 2019-06-10 PROBLEM — T22.232A PARTIAL THICKNESS BURN OF LEFT UPPER ARM: Status: RESOLVED | Noted: 2019-04-22 | Resolved: 2019-06-10

## 2019-06-10 PROCEDURE — 27201912 HC WOUND CARE DEBRIDEMENT SUPPLIES

## 2019-06-10 PROCEDURE — 11042 DBRDMT SUBQ TIS 1ST 20SQCM/<: CPT

## 2019-06-10 PROCEDURE — 99499 UNLISTED E&M SERVICE: CPT | Mod: ,,, | Performed by: SURGERY

## 2019-06-10 PROCEDURE — 99213 OFFICE O/P EST LOW 20 MIN: CPT | Mod: 25

## 2019-06-10 PROCEDURE — 97597 DBRDMT OPN WND 1ST 20 CM/<: CPT

## 2019-06-10 PROCEDURE — 99499 NO LOS: ICD-10-PCS | Mod: ,,, | Performed by: SURGERY

## 2019-06-10 NOTE — PROGRESS NOTES
[facility]  Wound Care  Progress Note    Problem List Items Addressed This Visit        Neuro    Paraplegia - Primary       Derm    Decubitus ulcer of left ischium, stage 4    Overview     Location: Left ischial region  Duration: Since 11/22/2015  Context: Decubitus ulcer of the left ischial region  Patient developed new wound on the right ischial region noted 1/9/2017. The wound was thought to be related to tubing from the wound vac and pressure. This wound healed  Associated Signs and Symptoms: Patient has no pain. He is insensate   Modifying Factors: The patient continues to smoke despite recommendations to stop. He has made no progress.  The wound vac was Utilized until January 9, 2017 on the left ischial region.  The patient completed 3 months therapy of antibiotic compound on 1/15/2018.  The patient sits from 5 am to 8 pm daily in a wheelchair.  Patient indicates he has not had any additional time of sitting.  On 416/2018, the patient was noted to have increased drainage and odor from his wound.  There was concern about infection and a culture was taken 4/16/2018 revealing methicillin sensitive staph aureus. Patient completed a course of Bactrim for 10 days.   Patient's wound noted to have more drainage and odor on 6/18/2018. Culture was taken to switch to topical antimicrobial to decrease colonization of wound bed. Topical antibiotic was not approved.  Repeat DNA analysis was performed of wound exudate and as of 12/10/2018.  Patient is receiving topical antibiotic since 02/18/2019.  Once his current supply was exhausted, switched to silver alginate.  If problem recurs then re-culture..           Current Assessment & Plan     Wound is progressing well.  Continue debridement to maintain active phase wound healing.  Continue offloading.         Chronic ulcer of right lower extremity with fat layer exposed    Overview     Patient with multiple wounds associated with picking his skin. This  includes areas on his legs and upper abdomen.         Chronic ulcer of left lower extremity, limited to breakdown of skin    Overview     Patient with multiple wounds associated with picking his skin. This includes areas on his legs and upper abdomen.           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 This includes areas on his legs and upper abdomen.                 See wound doc progress notes. Documents will be scanned.        Gilmar SHERIDAN Christopher  [facility]

## 2019-06-10 NOTE — ASSESSMENT & PLAN NOTE
Wound is progressing well.  Continue debridement to maintain active phase wound healing.  Continue offloading.

## 2019-06-24 ENCOUNTER — OFFICE VISIT (OUTPATIENT)
Dept: WOUND CARE | Facility: HOSPITAL | Age: 64
End: 2019-06-24
Attending: SURGERY
Payer: COMMERCIAL

## 2019-06-24 VITALS — DIASTOLIC BLOOD PRESSURE: 78 MMHG | RESPIRATION RATE: 18 BRPM | SYSTOLIC BLOOD PRESSURE: 165 MMHG | HEART RATE: 55 BPM

## 2019-06-24 DIAGNOSIS — L97.921: ICD-10-CM

## 2019-06-24 DIAGNOSIS — L89.324 DECUBITUS ULCER OF LEFT ISCHIUM, STAGE 4: Primary | ICD-10-CM

## 2019-06-24 DIAGNOSIS — L98.491 NON-PRESSURE CHRONIC ULCER OF SKIN OF OTHER SITES LIMITED TO BREAKDOWN OF SKIN: ICD-10-CM

## 2019-06-24 DIAGNOSIS — L97.912 CHRONIC ULCER OF RIGHT LOWER EXTREMITY WITH FAT LAYER EXPOSED: ICD-10-CM

## 2019-06-24 DIAGNOSIS — G82.20 PARAPLEGIA: ICD-10-CM

## 2019-06-24 PROCEDURE — 11042 DBRDMT SUBQ TIS 1ST 20SQCM/<: CPT

## 2019-06-24 PROCEDURE — 99499 UNLISTED E&M SERVICE: CPT | Mod: ,,, | Performed by: SURGERY

## 2019-06-24 PROCEDURE — 27201912 HC WOUND CARE DEBRIDEMENT SUPPLIES

## 2019-06-24 PROCEDURE — 99499 NO LOS: ICD-10-PCS | Mod: ,,, | Performed by: SURGERY

## 2019-06-24 NOTE — ASSESSMENT & PLAN NOTE
Wound continues to make steady progress.  Continue debridement to maintain active phase wound healing.  Continue offloading.

## 2019-06-24 NOTE — PROGRESS NOTES
[facility]  Wound Care  Progress Note    Problem List Items Addressed This Visit        Neuro    Paraplegia       Derm    Decubitus ulcer of left ischium, stage 4 - Primary    Overview     Location: Left ischial region  Duration: Since 11/22/2015  Context: Decubitus ulcer of the left ischial region  Patient developed new wound on the right ischial region noted 1/9/2017. The wound was thought to be related to tubing from the wound vac and pressure. This wound healed  Associated Signs and Symptoms: Patient has no pain. He is insensate   Modifying Factors: The patient continues to smoke despite recommendations to stop. He has made no progress.  The wound vac was Utilized until January 9, 2017 on the left ischial region.  The patient completed 3 months therapy of antibiotic compound on 1/15/2018.  The patient sits from 5 am to 8 pm daily in a wheelchair.  Patient indicates he has not had any additional time of sitting.  On 416/2018, the patient was noted to have increased drainage and odor from his wound.  There was concern about infection and a culture was taken 4/16/2018 revealing methicillin sensitive staph aureus. Patient completed a course of Bactrim for 10 days.   Patient's wound noted to have more drainage and odor on 6/18/2018. Culture was taken to switch to topical antimicrobial to decrease colonization of wound bed. Topical antibiotic was not approved.  Repeat DNA analysis was performed of wound exudate and as of 12/10/2018.  Patient is receiving topical antibiotic since 02/18/2019.  Once his current supply was exhausted, switched to silver alginate.  If problem recurs then re-culture..           Current Assessment & Plan     Wound continues to make steady progress.  Continue debridement to maintain active phase wound healing.  Continue offloading.         Chronic ulcer of right lower extremity with fat layer exposed    Overview     Patient with multiple wounds associated with picking his  skin. This includes areas on his legs and upper abdomen.         Current Assessment & Plan     Wound is improving.  Continue debridement to maintain active phase wound healing and to remove nonviable tissue.  Patient encouraged to stop picking.         Chronic ulcer of left lower extremity, limited to breakdown of skin    Overview     Patient with multiple wounds associated with picking his skin. This includes areas on his legs and upper abdomen.           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 This includes areas on his legs and upper abdomen.                 See wound doc progress notes. Documents will be scanned.        Gilmar SHERIDAN Christopher  [facility]

## 2019-06-24 NOTE — ASSESSMENT & PLAN NOTE
Wound is improving.  Continue debridement to maintain active phase wound healing and to remove nonviable tissue.  Patient encouraged to stop picking.

## 2019-07-08 ENCOUNTER — OFFICE VISIT (OUTPATIENT)
Dept: WOUND CARE | Facility: HOSPITAL | Age: 64
End: 2019-07-08
Attending: SURGERY
Payer: COMMERCIAL

## 2019-07-08 VITALS — RESPIRATION RATE: 20 BRPM | HEART RATE: 65 BPM | SYSTOLIC BLOOD PRESSURE: 137 MMHG | DIASTOLIC BLOOD PRESSURE: 70 MMHG

## 2019-07-08 DIAGNOSIS — L97.921: ICD-10-CM

## 2019-07-08 DIAGNOSIS — L98.491 NON-PRESSURE CHRONIC ULCER OF SKIN OF OTHER SITES LIMITED TO BREAKDOWN OF SKIN: ICD-10-CM

## 2019-07-08 DIAGNOSIS — L97.912 CHRONIC ULCER OF RIGHT LOWER EXTREMITY WITH FAT LAYER EXPOSED: ICD-10-CM

## 2019-07-08 DIAGNOSIS — L89.324 DECUBITUS ULCER OF LEFT ISCHIUM, STAGE 4: Primary | ICD-10-CM

## 2019-07-08 PROCEDURE — 27201912 HC WOUND CARE DEBRIDEMENT SUPPLIES

## 2019-07-08 PROCEDURE — 11042 DBRDMT SUBQ TIS 1ST 20SQCM/<: CPT

## 2019-07-08 PROCEDURE — 99499 NO LOS: ICD-10-PCS | Mod: ,,, | Performed by: SURGERY

## 2019-07-08 PROCEDURE — 99499 UNLISTED E&M SERVICE: CPT | Mod: ,,, | Performed by: SURGERY

## 2019-07-08 NOTE — ASSESSMENT & PLAN NOTE
"Wound continues to improve.  Patient counseled once again on not "picking".  Continue debridement to maintain active phase of wound healing.  "

## 2019-07-08 NOTE — PROGRESS NOTES
[facility]  Wound Care  Progress Note    Problem List Items Addressed This Visit        Derm    Decubitus ulcer of left ischium, stage 4 - Primary    Overview     Location: Left ischial region  Duration: Since 11/22/2015  Context: Decubitus ulcer of the left ischial region  Patient developed new wound on the right ischial region noted 1/9/2017. The wound was thought to be related to tubing from the wound vac and pressure. This wound healed  Associated Signs and Symptoms: Patient has no pain. He is insensate   Modifying Factors: The patient continues to smoke despite recommendations to stop. He has made no progress.  The wound vac was Utilized until January 9, 2017 on the left ischial region.  The patient completed 3 months therapy of antibiotic compound on 1/15/2018.  The patient sits from 5 am to 8 pm daily in a wheelchair.  Patient indicates he has not had any additional time of sitting.  On 416/2018, the patient was noted to have increased drainage and odor from his wound.  There was concern about infection and a culture was taken 4/16/2018 revealing methicillin sensitive staph aureus. Patient completed a course of Bactrim for 10 days.   Patient's wound noted to have more drainage and odor on 6/18/2018. Culture was taken to switch to topical antimicrobial to decrease colonization of wound bed. Topical antibiotic was not approved.  Repeat DNA analysis was performed of wound exudate and as of 12/10/2018.  Patient is receiving topical antibiotic since 02/18/2019.  Once his current supply was exhausted, switched to silver alginate.  If problem recurs then re-culture..           Current Assessment & Plan     Wound continues to make steady progress.  Continue debridement to maintain active phase wound healing.  Continue offloading.         Chronic ulcer of right lower extremity with fat layer exposed    Overview     Patient with multiple wounds associated with picking his skin. This includes areas  "on his legs and upper abdomen.         Current Assessment & Plan     Wound continues to improve.  Patient counseled once again on not "picking".  Continue debridement to maintain active phase of wound healing.         Chronic ulcer of left lower extremity, limited to breakdown of skin    Overview     Patient with multiple wounds associated with picking his skin. This includes areas on his legs and upper abdomen.           Current Assessment & Plan     Wounds are improving.  Debridement not required.  Continue gentian violet.         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 This includes areas on his legs and upper abdomen.  As of 07/08/2019, the wound is healed               See wound doc progress notes. Documents will be scanned.        Gilmar SHERIDAN Hebert [facility]  "

## 2019-07-22 ENCOUNTER — OFFICE VISIT (OUTPATIENT)
Dept: WOUND CARE | Facility: HOSPITAL | Age: 64
End: 2019-07-22
Attending: SURGERY
Payer: COMMERCIAL

## 2019-07-22 VITALS — SYSTOLIC BLOOD PRESSURE: 176 MMHG | HEART RATE: 60 BPM | DIASTOLIC BLOOD PRESSURE: 73 MMHG

## 2019-07-22 DIAGNOSIS — L89.324 DECUBITUS ULCER OF LEFT ISCHIUM, STAGE 4: Primary | ICD-10-CM

## 2019-07-22 DIAGNOSIS — G82.20 PARAPLEGIA: ICD-10-CM

## 2019-07-22 DIAGNOSIS — L97.912 CHRONIC ULCER OF RIGHT LOWER EXTREMITY WITH FAT LAYER EXPOSED: ICD-10-CM

## 2019-07-22 DIAGNOSIS — L97.921: ICD-10-CM

## 2019-07-22 PROBLEM — L98.491 NON-PRESSURE CHRONIC ULCER OF SKIN OF OTHER SITES LIMITED TO BREAKDOWN OF SKIN: Status: RESOLVED | Noted: 2019-06-10 | Resolved: 2019-07-22

## 2019-07-22 PROCEDURE — 11042 DBRDMT SUBQ TIS 1ST 20SQCM/<: CPT

## 2019-07-22 PROCEDURE — 99499 UNLISTED E&M SERVICE: CPT | Mod: ,,, | Performed by: SURGERY

## 2019-07-22 PROCEDURE — 99499 NO LOS: ICD-10-PCS | Mod: ,,, | Performed by: SURGERY

## 2019-07-22 PROCEDURE — 27201912 HC WOUND CARE DEBRIDEMENT SUPPLIES

## 2019-07-22 NOTE — ASSESSMENT & PLAN NOTE
Patient with noted to have increased odor in the wound with increased slough.  Continue debridement to maintain active phase wound healing.  Topical Dakin solution for several weeks to remove colonization and control odor.  Continue offloading.

## 2019-07-22 NOTE — PROGRESS NOTES
[facility]  Wound Care  Progress Note    Problem List Items Addressed This Visit        Neuro    Paraplegia       Derm    Decubitus ulcer of left ischium, stage 4 - Primary    Overview     Location: Left ischial region  Duration: Since 11/22/2015  Context: Decubitus ulcer of the left ischial region  Patient developed new wound on the right ischial region noted 1/9/2017. The wound was thought to be related to tubing from the wound vac and pressure. This wound healed  Associated Signs and Symptoms: Patient has no pain. He is insensate   Modifying Factors: The patient continues to smoke despite recommendations to stop. He has made no progress.  The wound vac was Utilized until January 9, 2017 on the left ischial region.  The patient completed 3 months therapy of antibiotic compound on 1/15/2018.  The patient sits from 5 am to 8 pm daily in a wheelchair.  Patient indicates he has not had any additional time of sitting.  On 416/2018, the patient was noted to have increased drainage and odor from his wound.  There was concern about infection and a culture was taken 4/16/2018 revealing methicillin sensitive staph aureus. Patient completed a course of Bactrim for 10 days.   Patient's wound noted to have more drainage and odor on 6/18/2018. Culture was taken to switch to topical antimicrobial to decrease colonization of wound bed. Topical antibiotic was not approved.  Repeat DNA analysis was performed of wound exudate and as of 12/10/2018.  Patient utilized a course of topical antibiotic starting 02/18/2019.  Silver alginate is currently being utilized.           Current Assessment & Plan     Patient with noted to have increased odor in the wound with increased slough.  Continue debridement to maintain active phase wound healing.  Topical Dakin solution for several weeks to remove colonization and control odor.  Continue offloading.         Chronic ulcer of right lower extremity with fat layer exposed     Overview     Patient with multiple wounds associated with picking his skin. This includeed areas on his legs and upper abdomen.         Current Assessment & Plan     Wound is improving.  Patient continues to pick.  Continue to discourage picking.         Chronic ulcer of left lower extremity, limited to breakdown of skin    Overview     Patient with multiple wounds associated with picking his skin. This includes areas on his legs.           Current Assessment & Plan     Wounds are improving.  They are dry with no exudate.               See wound doc progress notes. Documents will be scanned.        Gilmar SHERIDAN Christopher  [facility]

## 2019-08-12 ENCOUNTER — OFFICE VISIT (OUTPATIENT)
Dept: WOUND CARE | Facility: HOSPITAL | Age: 64
End: 2019-08-12
Attending: SURGERY
Payer: COMMERCIAL

## 2019-08-12 VITALS — HEART RATE: 65 BPM | RESPIRATION RATE: 18 BRPM | DIASTOLIC BLOOD PRESSURE: 60 MMHG | SYSTOLIC BLOOD PRESSURE: 90 MMHG

## 2019-08-12 DIAGNOSIS — L89.324 DECUBITUS ULCER OF LEFT ISCHIUM, STAGE 4: ICD-10-CM

## 2019-08-12 PROCEDURE — 99499 UNLISTED E&M SERVICE: CPT | Mod: ,,, | Performed by: SURGERY

## 2019-08-12 PROCEDURE — 27201912 HC WOUND CARE DEBRIDEMENT SUPPLIES

## 2019-08-12 PROCEDURE — 11042 DBRDMT SUBQ TIS 1ST 20SQCM/<: CPT

## 2019-08-12 PROCEDURE — 99499 NO LOS: ICD-10-PCS | Mod: ,,, | Performed by: SURGERY

## 2019-08-12 NOTE — ASSESSMENT & PLAN NOTE
There appears to be improvement since starting Dakin solution.  There is no odor today.  There is no significant drainage.  The bone remains covered with granulation tissue. Continue Dakin solution and debridement is needed

## 2019-08-12 NOTE — PROGRESS NOTES
[facility]  Wound Care  Progress Note    Problem List Items Addressed This Visit     Decubitus ulcer of left ischium, stage 4    Overview     Location: Left ischial region  Duration: Since 11/22/2015  Context: Decubitus ulcer of the left ischial region  Patient developed new wound on the right ischial region noted 1/9/2017. The wound was thought to be related to tubing from the wound vac and pressure. This wound healed  Associated Signs and Symptoms: Patient has no pain. He is insensate   Modifying Factors: The patient continues to smoke despite recommendations to stop. He has made no progress.  The wound vac was Utilized until January 9, 2017 on the left ischial region.  The patient completed 3 months therapy of antibiotic compound on 1/15/2018.  The patient sits from 5 am to 8 pm daily in a wheelchair.  Patient indicates he has not had any additional time of sitting.  On 416/2018, the patient was noted to have increased drainage and odor from his wound.  There was concern about infection and a culture was taken 4/16/2018 revealing methicillin sensitive staph aureus. Patient completed a course of Bactrim for 10 days.   Patient's wound noted to have more drainage and odor on 6/18/2018. Culture was taken to switch to topical antimicrobial to decrease colonization of wound bed. Topical antibiotic was not approved.  Repeat DNA analysis was performed of wound exudate and as of 12/10/2018.  Patient utilized a course of topical antibiotic starting 02/18/2019.  Patient recently started Dakin solution due to increased drainage and odor.  Area is improved today with no significant drainage and no odor.  There is no exposed bone wound is granulating with minimal slough there is no significant tunneling         Current Assessment & Plan     There appears to be improvement since starting Dakin solution.  There is no odor today.  There is no significant drainage.  The bone remains covered with granulation  tissue. Continue Dakin solution and debridement is needed               See wound doc progress notes. Documents will be scanned.        Manny SIMPSON Marino [facility]

## 2019-08-26 ENCOUNTER — OFFICE VISIT (OUTPATIENT)
Dept: WOUND CARE | Facility: HOSPITAL | Age: 64
End: 2019-08-26
Attending: SURGERY
Payer: COMMERCIAL

## 2019-08-26 VITALS — HEART RATE: 86 BPM | SYSTOLIC BLOOD PRESSURE: 140 MMHG | DIASTOLIC BLOOD PRESSURE: 76 MMHG

## 2019-08-26 DIAGNOSIS — G82.20 PARAPLEGIA: ICD-10-CM

## 2019-08-26 DIAGNOSIS — F17.210 CIGARETTE SMOKER: ICD-10-CM

## 2019-08-26 DIAGNOSIS — L89.324 DECUBITUS ULCER OF LEFT ISCHIUM, STAGE 4: Primary | ICD-10-CM

## 2019-08-26 DIAGNOSIS — L97.912 CHRONIC ULCER OF RIGHT LOWER EXTREMITY WITH FAT LAYER EXPOSED: ICD-10-CM

## 2019-08-26 PROBLEM — L97.921: Status: RESOLVED | Noted: 2019-06-10 | Resolved: 2019-08-26

## 2019-08-26 PROCEDURE — 11042 DBRDMT SUBQ TIS 1ST 20SQCM/<: CPT

## 2019-08-26 PROCEDURE — 99499 UNLISTED E&M SERVICE: CPT | Mod: ,,, | Performed by: SURGERY

## 2019-08-26 PROCEDURE — 27201912 HC WOUND CARE DEBRIDEMENT SUPPLIES

## 2019-08-26 PROCEDURE — 99499 NO LOS: ICD-10-PCS | Mod: ,,, | Performed by: SURGERY

## 2019-08-26 NOTE — ASSESSMENT & PLAN NOTE
Wound is improving.  Restart silver alginate.  Continue debridement to maintain active phase wound healing.  Continue offloading.

## 2019-08-26 NOTE — PROGRESS NOTES
[facility]  Wound Care  Progress Note    Problem List Items Addressed This Visit        Neuro    Paraplegia       Derm    Decubitus ulcer of left ischium, stage 4 - Primary    Overview     Location: Left ischial region  Duration: Since 11/22/2015  Context: Decubitus ulcer of the left ischial region  Patient developed new wound on the right ischial region noted 1/9/2017. The wound was thought to be related to tubing from the wound vac and pressure. This wound healed  Associated Signs and Symptoms: Patient has no pain. He is insensate   Modifying Factors: The patient continues to smoke despite recommendations to stop. He has made no progress.  The wound vac was Utilized until January 9, 2017 on the left ischial region.  The patient completed 3 months therapy of antibiotic compound on 1/15/2018.  The patient sits from 5 am to 8 pm daily in a wheelchair.  Patient indicates he has not had any additional time of sitting.  On 416/2018, the patient was noted to have increased drainage and odor from his wound.  There was concern about infection and a culture was taken 4/16/2018 revealing methicillin sensitive staph aureus. Patient completed a course of Bactrim for 10 days.   Patient's wound noted to have more drainage and odor on 6/18/2018. Culture was taken to switch to topical antimicrobial to decrease colonization of wound bed. Topical antibiotic was not approved.  Repeat DNA analysis was performed of wound exudate and as of 12/10/2018.  Patient utilized a course of topical antibiotic starting 02/18/2019.  Patient recently started Dakin solution due to increased drainage and odor.  Drainage and odor has resolved as of 08/26/2019.         Current Assessment & Plan     Wound is improving.  Restart silver alginate.  Continue debridement to maintain active phase wound healing.  Continue offloading.           Chronic ulcer of right lower extremity with fat layer exposed    Overview     Patient with multiple  wounds associated with picking his skin. This includeed areas on his legs and upper abdomen.         Current Assessment & Plan     Wound healed as of 08/26/2019.            Other    Cigarette smoker    Current Assessment & Plan     Patient instructed to stop smoking.               See wound doc progress notes. Documents will be scanned.        Gilmar SHERIDAN Christopher  [facility]

## 2019-09-09 ENCOUNTER — OFFICE VISIT (OUTPATIENT)
Dept: WOUND CARE | Facility: HOSPITAL | Age: 64
End: 2019-09-09
Attending: SURGERY
Payer: COMMERCIAL

## 2019-09-09 VITALS
TEMPERATURE: 98 F | RESPIRATION RATE: 18 BRPM | DIASTOLIC BLOOD PRESSURE: 70 MMHG | SYSTOLIC BLOOD PRESSURE: 144 MMHG | HEART RATE: 60 BPM

## 2019-09-09 DIAGNOSIS — L89.324 DECUBITUS ULCER OF LEFT ISCHIUM, STAGE 4: ICD-10-CM

## 2019-09-09 PROCEDURE — 99499 UNLISTED E&M SERVICE: CPT | Mod: ,,, | Performed by: SURGERY

## 2019-09-09 PROCEDURE — 27201912 HC WOUND CARE DEBRIDEMENT SUPPLIES

## 2019-09-09 PROCEDURE — 11042 DBRDMT SUBQ TIS 1ST 20SQCM/<: CPT

## 2019-09-09 PROCEDURE — 99499 NO LOS: ICD-10-PCS | Mod: ,,, | Performed by: SURGERY

## 2019-09-09 NOTE — ASSESSMENT & PLAN NOTE
Patient returns today with increased odor and drainage. On evaluation of the wound the bone remains covered.  There is granulation tissue at the base of the wound.  There is no significant slough for nonviable tissue. Will change back to Dakins solution. Continue offloading.  Continue sharp debridement is needed.

## 2019-09-09 NOTE — PROGRESS NOTES
[facility]  Wound Care  Progress Note    Problem List Items Addressed This Visit     Decubitus ulcer of left ischium, stage 4    Overview     Location: Left ischial region  Duration: Since 11/22/2015  Context: Decubitus ulcer of the left ischial region  Patient developed new wound on the right ischial region noted 1/9/2017. The wound was thought to be related to tubing from the wound vac and pressure. This wound healed  Associated Signs and Symptoms: Patient has no pain. He is insensate   Modifying Factors: The patient continues to smoke despite recommendations to stop. He has made no progress.  The wound vac was Utilized until January 9, 2017 on the left ischial region.  The patient completed 3 months therapy of antibiotic compound on 1/15/2018.  The patient sits from 5 am to 8 pm daily in a wheelchair.  Patient indicates he has not had any additional time of sitting.  On 416/2018, the patient was noted to have increased drainage and odor from his wound.  There was concern about infection and a culture was taken 4/16/2018 revealing methicillin sensitive staph aureus. Patient completed a course of Bactrim for 10 days.   Patient's wound noted to have more drainage and odor on 6/18/2018. Culture was taken to switch to topical antimicrobial to decrease colonization of wound bed. Topical antibiotic was not approved.  Repeat DNA analysis was performed of wound exudate and as of 12/10/2018.  Patient utilized a course of topical antibiotic starting 02/18/2019.  Patient recently started Dakin solution due to increased drainage and odor.  Drainage and odor has resolved as of 08/26/2019.  Patient currently using silver.         Current Assessment & Plan     Patient returns today with increased odor and drainage. On evaluation of the wound the bone remains covered.  There is granulation tissue at the base of the wound.  There is no significant slough for nonviable tissue. Will change back to Dakins  solution. Continue offloading.  Continue sharp debridement is needed.               See wound doc progress notes. Documents will be scanned.        Manny SIMPSON Marino [facility]

## 2019-09-23 ENCOUNTER — OFFICE VISIT (OUTPATIENT)
Dept: WOUND CARE | Facility: HOSPITAL | Age: 64
End: 2019-09-23
Attending: SURGERY
Payer: COMMERCIAL

## 2019-09-23 VITALS — TEMPERATURE: 98 F | HEART RATE: 62 BPM | SYSTOLIC BLOOD PRESSURE: 177 MMHG | DIASTOLIC BLOOD PRESSURE: 97 MMHG

## 2019-09-23 DIAGNOSIS — L89.324 DECUBITUS ULCER OF LEFT ISCHIUM, STAGE 4: ICD-10-CM

## 2019-09-23 PROCEDURE — 99499 UNLISTED E&M SERVICE: CPT | Mod: ,,, | Performed by: SURGERY

## 2019-09-23 PROCEDURE — 99499 NO LOS: ICD-10-PCS | Mod: ,,, | Performed by: SURGERY

## 2019-09-23 PROCEDURE — 27201912 HC WOUND CARE DEBRIDEMENT SUPPLIES

## 2019-09-23 PROCEDURE — 11042 DBRDMT SUBQ TIS 1ST 20SQCM/<: CPT

## 2019-09-23 NOTE — ASSESSMENT & PLAN NOTE
Wound markedly improved since starting Dakin solution.  Odor and drainage have resolved.  No sign of infection.  Continue Dakin's.

## 2019-09-23 NOTE — PROGRESS NOTES
[facility]  Wound Care  Progress Note    Problem List Items Addressed This Visit     Decubitus ulcer of left ischium, stage 4    Overview     Location: Left ischial region  Duration: Since 11/22/2015  Context: Decubitus ulcer of the left ischial region  Patient developed new wound on the right ischial region noted 1/9/2017. The wound was thought to be related to tubing from the wound vac and pressure. This wound healed  Associated Signs and Symptoms: Patient has no pain. He is insensate   Modifying Factors: The patient continues to smoke despite recommendations to stop. He has made no progress.  The wound vac was Utilized until January 9, 2017 on the left ischial region.  The patient completed 3 months therapy of antibiotic compound on 1/15/2018.  The patient sits from 5 am to 8 pm daily in a wheelchair.  Patient indicates he has not had any additional time of sitting.  On 416/2018, the patient was noted to have increased drainage and odor from his wound.  There was concern about infection and a culture was taken 4/16/2018 revealing methicillin sensitive staph aureus. Patient completed a course of Bactrim for 10 days.   Patient's wound noted to have more drainage and odor on 6/18/2018. Culture was taken to switch to topical antimicrobial to decrease colonization of wound bed. Topical antibiotic was not approved.  Repeat DNA analysis was performed of wound exudate and as of 12/10/2018.  Patient utilized a course of topical antibiotic starting 02/18/2019.  Patient recently started Dakin solution due to increased drainage and odor.  Drainage and odor has resolved as of 08/26/2019.  Patient was changed to silver dressing. Patient presented several weeks ago with recurrence drainage and odor.  Dakin's solution was restarted 9/ 9 /19.  Wound is markedly improved.  There is no odor or significant drainage.  The granulation tissue is nonfriable.  There is no sign of infection or exposed bone.          Current Assessment & Plan     Wound markedly improved since starting Dakin solution.  Odor and drainage have resolved.  No sign of infection.  Continue Dakin's.               See wound doc progress notes. Documents will be scanned.        Manny SIMPSON Vann  [facility]

## 2019-10-07 ENCOUNTER — OFFICE VISIT (OUTPATIENT)
Dept: WOUND CARE | Facility: HOSPITAL | Age: 64
End: 2019-10-07
Attending: SURGERY
Payer: COMMERCIAL

## 2019-10-07 VITALS — HEART RATE: 70 BPM | SYSTOLIC BLOOD PRESSURE: 96 MMHG | DIASTOLIC BLOOD PRESSURE: 60 MMHG

## 2019-10-07 DIAGNOSIS — L89.324 DECUBITUS ULCER OF LEFT ISCHIUM, STAGE 4: ICD-10-CM

## 2019-10-07 PROCEDURE — 27201912 HC WOUND CARE DEBRIDEMENT SUPPLIES

## 2019-10-07 PROCEDURE — 99499 UNLISTED E&M SERVICE: CPT | Mod: ,,, | Performed by: SURGERY

## 2019-10-07 PROCEDURE — 11042 DBRDMT SUBQ TIS 1ST 20SQCM/<: CPT

## 2019-10-07 PROCEDURE — 99499 NO LOS: ICD-10-PCS | Mod: ,,, | Performed by: SURGERY

## 2019-10-07 NOTE — PROGRESS NOTES
[facility]  Wound Care  Progress Note    Problem List Items Addressed This Visit     Decubitus ulcer of left ischium, stage 4    Overview     Location: Left ischial region  Duration: Since 11/22/2015  Context: Decubitus ulcer of the left ischial region  Patient developed new wound on the right ischial region noted 1/9/2017. The wound was thought to be related to tubing from the wound vac and pressure. This wound healed  Associated Signs and Symptoms: Patient has no pain. He is insensate   Modifying Factors: The patient continues to smoke despite recommendations to stop. He has made no progress.  The wound vac was Utilized until January 9, 2017 on the left ischial region.  The patient completed 3 months therapy of antibiotic compound on 1/15/2018.  The patient sits from 5 am to 8 pm daily in a wheelchair.  Patient indicates he has not had any additional time of sitting.  On 416/2018, the patient was noted to have increased drainage and odor from his wound.  There was concern about infection and a culture was taken 4/16/2018 revealing methicillin sensitive staph aureus. Patient completed a course of Bactrim for 10 days.   Patient's wound noted to have more drainage and odor on 6/18/2018. Culture was taken to switch to topical antimicrobial to decrease colonization of wound bed. Topical antibiotic was not approved.  Repeat DNA analysis was performed of wound exudate and as of 12/10/2018.  Patient utilized a course of topical antibiotic starting 02/18/2019.  Patient recently started Dakin solution due to increased drainage and odor.  Drainage and odor has resolved as of 08/26/2019.  Patient was changed to silver dressing. Patient presented several weeks ago with recurrence drainage and odor.  Dakin's solution was restarted 9/ 9 /19.  The wound continues to improve on Dakin solution. There is no drainage or odor.  There is epithelialization at the wound edge.  The bone remains covered with no sign of  infection.         Current Assessment & Plan     Continue Dakin solution               See wound doc progress notes. Documents will be scanned.        Manny SIMPSON Marino [facility]

## 2019-10-21 ENCOUNTER — OFFICE VISIT (OUTPATIENT)
Dept: WOUND CARE | Facility: HOSPITAL | Age: 64
End: 2019-10-21
Attending: SURGERY
Payer: COMMERCIAL

## 2019-10-21 VITALS — HEART RATE: 70 BPM | SYSTOLIC BLOOD PRESSURE: 106 MMHG | DIASTOLIC BLOOD PRESSURE: 60 MMHG

## 2019-10-21 DIAGNOSIS — L89.324 DECUBITUS ULCER OF LEFT ISCHIUM, STAGE 4: ICD-10-CM

## 2019-10-21 PROCEDURE — 11042 DBRDMT SUBQ TIS 1ST 20SQCM/<: CPT

## 2019-10-21 PROCEDURE — 27201912 HC WOUND CARE DEBRIDEMENT SUPPLIES

## 2019-10-21 PROCEDURE — 99499 NO LOS: ICD-10-PCS | Mod: ,,, | Performed by: SURGERY

## 2019-10-21 PROCEDURE — 99499 UNLISTED E&M SERVICE: CPT | Mod: ,,, | Performed by: SURGERY

## 2019-10-21 NOTE — ASSESSMENT & PLAN NOTE
Continue Dakin solution. We will go to every other day dressing change.  Patient to return in 2 weeks.

## 2019-11-04 ENCOUNTER — OFFICE VISIT (OUTPATIENT)
Dept: WOUND CARE | Facility: HOSPITAL | Age: 64
End: 2019-11-04
Attending: SURGERY
Payer: COMMERCIAL

## 2019-11-04 VITALS — SYSTOLIC BLOOD PRESSURE: 154 MMHG | DIASTOLIC BLOOD PRESSURE: 80 MMHG | TEMPERATURE: 98 F

## 2019-11-04 DIAGNOSIS — L89.324 DECUBITUS ULCER OF LEFT ISCHIUM, STAGE 4: ICD-10-CM

## 2019-11-04 PROCEDURE — 11042 DBRDMT SUBQ TIS 1ST 20SQCM/<: CPT

## 2019-11-04 PROCEDURE — 27201912 HC WOUND CARE DEBRIDEMENT SUPPLIES

## 2019-11-04 PROCEDURE — 99499 UNLISTED E&M SERVICE: CPT | Mod: ,,, | Performed by: SURGERY

## 2019-11-04 PROCEDURE — 99499 NO LOS: ICD-10-PCS | Mod: ,,, | Performed by: SURGERY

## 2019-11-04 NOTE — PROGRESS NOTES
[facility]  Wound Care  Progress Note    Problem List Items Addressed This Visit     Decubitus ulcer of left ischium, stage 4    Overview     Location: Left ischial region  Duration: Since 11/22/2015  Context: Decubitus ulcer of the left ischial region  Patient developed new wound on the right ischial region noted 1/9/2017. The wound was thought to be related to tubing from the wound vac and pressure. This wound healed  Associated Signs and Symptoms: Patient has no pain. He is insensate   Modifying Factors: The patient continues to smoke despite recommendations to stop. He has made no progress.  The wound vac was Utilized until January 9, 2017 on the left ischial region.  The patient completed 3 months therapy of antibiotic compound on 1/15/2018.  The patient sits from 5 am to 8 pm daily in a wheelchair.  Patient indicates he has not had any additional time of sitting.  On 416/2018, the patient was noted to have increased drainage and odor from his wound.  There was concern about infection and a culture was taken 4/16/2018 revealing methicillin sensitive staph aureus. Patient completed a course of Bactrim for 10 days.   Patient's wound noted to have more drainage and odor on 6/18/2018. Culture was taken to switch to topical antimicrobial to decrease colonization of wound bed. Topical antibiotic was not approved.  Repeat DNA analysis was performed of wound exudate and as of 12/10/2018.  Patient utilized a course of topical antibiotic starting 02/18/2019.  Patient recently started Dakin solution due to increased drainage and odor.  Drainage and odor has resolved as of 08/26/2019.  Patient was changed to silver dressing. Patient presented several weeks ago with recurrence drainage and odor.  Dakin's solution was restarted 9/ 9 /19.  Wound in drainage improved since starting Dakin's.  Patient however returns today with increased drainage and odor since changing from daily dressing changes to every  other day dressing changes.  The wound bed however remains fairly clean with no evidence of necrosis exposed bone or significant slough.  There is no sign of infection.         Current Assessment & Plan     Will change back to daily Dakin's dressing changes.  If the drainage persists consider culture and topical antibiotics based on culture results.               See wound doc progress notes. Documents will be scanned.        Manny SIMPSON Vann  [facility]

## 2019-11-04 NOTE — ASSESSMENT & PLAN NOTE
Will change back to daily Dakin's dressing changes.  If the drainage persists consider culture and topical antibiotics based on culture results.

## 2019-11-18 ENCOUNTER — OFFICE VISIT (OUTPATIENT)
Dept: WOUND CARE | Facility: HOSPITAL | Age: 64
End: 2019-11-18
Attending: SURGERY
Payer: COMMERCIAL

## 2019-11-18 VITALS — HEART RATE: 53 BPM | DIASTOLIC BLOOD PRESSURE: 92 MMHG | SYSTOLIC BLOOD PRESSURE: 147 MMHG | RESPIRATION RATE: 20 BRPM

## 2019-11-18 DIAGNOSIS — L89.324 DECUBITUS ULCER OF LEFT ISCHIUM, STAGE 4: ICD-10-CM

## 2019-11-18 PROCEDURE — 11042 DBRDMT SUBQ TIS 1ST 20SQCM/<: CPT

## 2019-11-18 PROCEDURE — 27201912 HC WOUND CARE DEBRIDEMENT SUPPLIES

## 2019-11-18 PROCEDURE — 99499 UNLISTED E&M SERVICE: CPT | Mod: ,,, | Performed by: SURGERY

## 2019-11-18 PROCEDURE — 99499 NO LOS: ICD-10-PCS | Mod: ,,, | Performed by: SURGERY

## 2019-11-18 NOTE — ASSESSMENT & PLAN NOTE
Wound shows signs of epithelialization.  There is some areas of pressure near the edge of the wound.  There remains with decreased drainage. The wound bed is mostly granulating.  Continue Dakin solution.  Continue offloading.  Continue debridement is needed.

## 2019-11-18 NOTE — PROGRESS NOTES
[facility]  Wound Care  Progress Note    Problem List Items Addressed This Visit     Decubitus ulcer of left ischium, stage 4    Overview     Location: Left ischial region  Duration: Since 11/22/2015  Context: Decubitus ulcer of the left ischial region  Patient developed new wound on the right ischial region noted 1/9/2017. The wound was thought to be related to tubing from the wound vac and pressure. This wound healed  Associated Signs and Symptoms: Patient has no pain. He is insensate   Modifying Factors: The patient continues to smoke despite recommendations to stop. He has made no progress.  The wound vac was Utilized until January 9, 2017 on the left ischial region.  The patient completed 3 months therapy of antibiotic compound on 1/15/2018.  The patient sits from 5 am to 8 pm daily in a wheelchair.  Patient indicates he has not had any additional time of sitting.  On 416/2018, the patient was noted to have increased drainage and odor from his wound.  There was concern about infection and a culture was taken 4/16/2018 revealing methicillin sensitive staph aureus. Patient completed a course of Bactrim for 10 days.   Patient's wound noted to have more drainage and odor on 6/18/2018. Culture was taken to switch to topical antimicrobial to decrease colonization of wound bed. Topical antibiotic was not approved.  Repeat DNA analysis was performed of wound exudate and as of 12/10/2018.  Patient utilized a course of topical antibiotic starting 02/18/2019.  Patient recently started Dakin solution due to increased drainage and odor.  Drainage and odor has resolved as of 08/26/2019.  Patient was changed to silver dressing. Patient presented several weeks ago with recurrence drainage and odor.  Dakin's solution was restarted 9/ 9 /19.  Wound in drainage improved since starting Dakin's.  Patient however returns today with increased drainage and odor since changing from daily dressing changes to every  other day dressing changes.  The wound bed however remains fairly clean with no evidence of necrosis exposed bone or significant slough.  There is no sign of infection.         Current Assessment & Plan     Wound shows signs of epithelialization.  There is some areas of pressure near the edge of the wound.  There remains with decreased drainage. The wound bed is mostly granulating.  Continue Dakin solution.  Continue offloading.  Continue debridement is needed.               See wound doc progress notes. Documents will be scanned.        Manny SIMPSON Vann  [facility]

## 2019-12-02 ENCOUNTER — OFFICE VISIT (OUTPATIENT)
Dept: WOUND CARE | Facility: HOSPITAL | Age: 64
End: 2019-12-02
Attending: SURGERY
Payer: COMMERCIAL

## 2019-12-02 VITALS — SYSTOLIC BLOOD PRESSURE: 139 MMHG | DIASTOLIC BLOOD PRESSURE: 91 MMHG | HEART RATE: 59 BPM | RESPIRATION RATE: 19 BRPM

## 2019-12-02 DIAGNOSIS — L89.324 DECUBITUS ULCER OF LEFT ISCHIUM, STAGE 4: ICD-10-CM

## 2019-12-02 PROCEDURE — 11042 DBRDMT SUBQ TIS 1ST 20SQCM/<: CPT

## 2019-12-02 PROCEDURE — 99499 NO LOS: ICD-10-PCS | Mod: ,,, | Performed by: SURGERY

## 2019-12-02 PROCEDURE — 99499 UNLISTED E&M SERVICE: CPT | Mod: ,,, | Performed by: SURGERY

## 2019-12-02 PROCEDURE — 27201912 HC WOUND CARE DEBRIDEMENT SUPPLIES

## 2019-12-02 NOTE — ASSESSMENT & PLAN NOTE
Wound remains clean and granulating.  There is epithelialization bridging across the wound creating 2 separate wounds.  There is no exposed bone or sign of infection.  Wound appears decreased in size.  Will change to silver alginate and.  Dakin's for now.  Continue offloading.

## 2019-12-02 NOTE — PROGRESS NOTES
[facility]  Wound Care  Progress Note    Problem List Items Addressed This Visit     Decubitus ulcer of left ischium, stage 4    Overview     Location: Left ischial region  Duration: Since 11/22/2015  Context: Decubitus ulcer of the left ischial region  Patient developed new wound on the right ischial region noted 1/9/2017. The wound was thought to be related to tubing from the wound vac and pressure. This wound healed  Associated Signs and Symptoms: Patient has no pain. He is insensate   Modifying Factors: The patient continues to smoke despite recommendations to stop. He has made no progress.  The wound vac was Utilized until January 9, 2017 on the left ischial region.  The patient completed 3 months therapy of antibiotic compound on 1/15/2018.  The patient sits from 5 am to 8 pm daily in a wheelchair.  Patient indicates he has not had any additional time of sitting.  On 416/2018, the patient was noted to have increased drainage and odor from his wound.  There was concern about infection and a culture was taken 4/16/2018 revealing methicillin sensitive staph aureus. Patient completed a course of Bactrim for 10 days.   Patient's wound noted to have more drainage and odor on 6/18/2018. Culture was taken to switch to topical antimicrobial to decrease colonization of wound bed. Topical antibiotic was not approved.  Repeat DNA analysis was performed of wound exudate and as of 12/10/2018.  Patient utilized a course of topical antibiotic starting 02/18/2019.  Patient recently started Dakin solution due to increased drainage and odor.  Drainage and odor has resolved as of 08/26/2019.  Patient was changed to silver dressing. Patient presented several weeks ago with recurrence drainage and odor.  Dakin's solution was restarted 9/ 9 /19.  Wound in drainage improved since starting Dakin's.  Patient however returns today with increased drainage and odor since changing from daily dressing changes to every  other day dressing changes.  The wound bed however remains fairly clean with no evidence of necrosis exposed bone or significant slough.  There is no sign of infection.         Current Assessment & Plan     Wound remains clean and granulating.  There is epithelialization bridging across the wound creating 2 separate wounds.  There is no exposed bone or sign of infection.  Wound appears decreased in size.  Will change to silver alginate and.  Dakin's for now.  Continue offloading.               See wound doc progress notes. Documents will be scanned.        Manny SIMPSON Vann  [facility]

## 2019-12-16 ENCOUNTER — OFFICE VISIT (OUTPATIENT)
Dept: WOUND CARE | Facility: HOSPITAL | Age: 64
End: 2019-12-16
Attending: SURGERY
Payer: COMMERCIAL

## 2019-12-16 DIAGNOSIS — L89.324 DECUBITUS ULCER OF LEFT ISCHIUM, STAGE 4: ICD-10-CM

## 2019-12-16 PROCEDURE — 11042 DBRDMT SUBQ TIS 1ST 20SQCM/<: CPT

## 2019-12-16 PROCEDURE — 99499 UNLISTED E&M SERVICE: CPT | Mod: ,,, | Performed by: SURGERY

## 2019-12-16 PROCEDURE — 27201912 HC WOUND CARE DEBRIDEMENT SUPPLIES

## 2019-12-16 PROCEDURE — 99499 NO LOS: ICD-10-PCS | Mod: ,,, | Performed by: SURGERY

## 2019-12-16 NOTE — PROGRESS NOTES
[facility]  Wound Care  Progress Note    Problem List Items Addressed This Visit     Decubitus ulcer of left ischium, stage 4    Overview     Location: Left ischial region  Duration: Since 11/22/2015  Context: Decubitus ulcer of the left ischial region  Patient developed new wound on the right ischial region noted 1/9/2017. The wound was thought to be related to tubing from the wound vac and pressure. This wound healed  Associated Signs and Symptoms: Patient has no pain. He is insensate   Modifying Factors: The patient continues to smoke despite recommendations to stop. He has made no progress.  The wound vac was Utilized until January 9, 2017 on the left ischial region.  The patient completed 3 months therapy of antibiotic compound on 1/15/2018.  The patient sits from 5 am to 8 pm daily in a wheelchair.  Patient indicates he has not had any additional time of sitting.  On 416/2018, the patient was noted to have increased drainage and odor from his wound.  There was concern about infection and a culture was taken 4/16/2018 revealing methicillin sensitive staph aureus. Patient completed a course of Bactrim for 10 days.   Patient's wound noted to have more drainage and odor on 6/18/2018. Culture was taken to switch to topical antimicrobial to decrease colonization of wound bed. Topical antibiotic was not approved.  Repeat DNA analysis was performed of wound exudate and as of 12/10/2018.  Patient utilized a course of topical antibiotic starting 02/18/2019.  Patient recently started Dakin solution due to increased drainage and odor.  Drainage and odor has resolved as of 08/26/2019.  Patient was changed to silver dressing. Patient presented several weeks ago with recurrence drainage and odor.  Dakin's solution was restarted 9/ 9 /19.  Wound in drainage improved since starting Dakin's.  Patient however returns today with increased drainage and odor since changing from daily dressing changes to every  other day dressing changes.  The wound bed however remains fairly clean with no evidence of necrosis exposed bone or significant slough.  There is no sign of infection.         Current Assessment & Plan     Dakin's solution was stopped and patient current using silver alginate.  There is a moderate amount of drainage.  There is no sign of infection.  Wound bed is granulating.  Wound edges are epithelializing.  No exposed bone.  Continue current management.               See wound doc progress notes. Documents will be scanned.        Manny SIMPSON Vann  [facility]

## 2019-12-16 NOTE — ASSESSMENT & PLAN NOTE
Dakin's solution was stopped and patient current using silver alginate.  There is a moderate amount of drainage.  There is no sign of infection.  Wound bed is granulating.  Wound edges are epithelializing.  No exposed bone.  Continue current management.

## 2019-12-17 VITALS
SYSTOLIC BLOOD PRESSURE: 90 MMHG | DIASTOLIC BLOOD PRESSURE: 65 MMHG | RESPIRATION RATE: 18 BRPM | TEMPERATURE: 98 F | HEART RATE: 88 BPM

## 2019-12-30 ENCOUNTER — OFFICE VISIT (OUTPATIENT)
Dept: WOUND CARE | Facility: HOSPITAL | Age: 64
End: 2019-12-30
Attending: SURGERY
Payer: COMMERCIAL

## 2019-12-30 VITALS — HEART RATE: 38 BPM | DIASTOLIC BLOOD PRESSURE: 97 MMHG | SYSTOLIC BLOOD PRESSURE: 151 MMHG | TEMPERATURE: 98 F

## 2019-12-30 DIAGNOSIS — L89.324 DECUBITUS ULCER OF LEFT ISCHIUM, STAGE 4: ICD-10-CM

## 2019-12-30 PROCEDURE — 11042 DBRDMT SUBQ TIS 1ST 20SQCM/<: CPT

## 2019-12-30 PROCEDURE — 99499 UNLISTED E&M SERVICE: CPT | Mod: ,,, | Performed by: SURGERY

## 2019-12-30 PROCEDURE — 27201912 HC WOUND CARE DEBRIDEMENT SUPPLIES

## 2019-12-30 PROCEDURE — 99499 NO LOS: ICD-10-PCS | Mod: ,,, | Performed by: SURGERY

## 2019-12-30 NOTE — ASSESSMENT & PLAN NOTE
Ambreen wound area and drainage improved since stopping Dakin's.  Will continue silver dressing and offloading

## 2019-12-30 NOTE — PROGRESS NOTES
[facility]  Wound Care  Progress Note    Problem List Items Addressed This Visit     Decubitus ulcer of left ischium, stage 4    Overview     Location: Left ischial region  Duration: Since 11/22/2015  Context: Decubitus ulcer of the left ischial region  Patient developed new wound on the right ischial region noted 1/9/2017. The wound was thought to be related to tubing from the wound vac and pressure. This wound healed  Associated Signs and Symptoms: Patient has no pain. He is insensate   Modifying Factors: The patient continues to smoke despite recommendations to stop. He has made no progress.  The wound vac was Utilized until January 9, 2017 on the left ischial region.  The patient completed 3 months therapy of antibiotic compound on 1/15/2018.  The patient sits from 5 am to 8 pm daily in a wheelchair.  Patient indicates he has not had any additional time of sitting.  On 416/2018, the patient was noted to have increased drainage and odor from his wound.  There was concern about infection and a culture was taken 4/16/2018 revealing methicillin sensitive staph aureus. Patient completed a course of Bactrim for 10 days.   Patient's wound noted to have more drainage and odor on 6/18/2018. Culture was taken to switch to topical antimicrobial to decrease colonization of wound bed. Topical antibiotic was not approved.  Repeat DNA analysis was performed of wound exudate and as of 12/10/2018.  Patient utilized a course of topical antibiotic starting 02/18/2019.  Patient recently started Dakin solution due to increased drainage and odor.  Drainage and odor has resolved as of 08/26/2019.  Patient was changed to silver dressing. Patient presented several weeks ago with recurrence drainage and odor.  Dakin's solution was restarted 9/ 9 /19.  Wound in drainage improved since starting Dakin's.  Patient however returns today with increased drainage and odor since changing from daily dressing changes to every  other day dressing changes.  The wound bed however remains fairly clean with no evidence of necrosis exposed bone or significant slough.  There is no sign of infection.  Dakin's solution was stopped last visit and he was switched to silver alginate.  The jerry wound area is much improved since the drainage has decreased.  The wound bed remains clean with minimal slough.  The wound is granulating with no exposed bone or sign of infection.  Will continue in use silver dressing         Current Assessment & Plan     Jerry wound area and drainage improved since stopping Dakin's.  Will continue silver dressing and offloading               See wound doc progress notes. Documents will be scanned.        Manny SIMPSON Vann  [facility]

## 2020-01-20 ENCOUNTER — OFFICE VISIT (OUTPATIENT)
Dept: WOUND CARE | Facility: HOSPITAL | Age: 65
End: 2020-01-20
Attending: SURGERY
Payer: COMMERCIAL

## 2020-01-20 VITALS
SYSTOLIC BLOOD PRESSURE: 145 MMHG | TEMPERATURE: 98 F | HEART RATE: 56 BPM | RESPIRATION RATE: 20 BRPM | DIASTOLIC BLOOD PRESSURE: 66 MMHG

## 2020-01-20 DIAGNOSIS — F17.210 CIGARETTE SMOKER: ICD-10-CM

## 2020-01-20 DIAGNOSIS — G82.20 PARAPLEGIA: ICD-10-CM

## 2020-01-20 DIAGNOSIS — L89.324 DECUBITUS ULCER OF LEFT ISCHIUM, STAGE 4: Primary | ICD-10-CM

## 2020-01-20 PROCEDURE — 99499 UNLISTED E&M SERVICE: CPT | Mod: ,,, | Performed by: SURGERY

## 2020-01-20 PROCEDURE — 99499 NO LOS: ICD-10-PCS | Mod: ,,, | Performed by: SURGERY

## 2020-01-20 PROCEDURE — 27201912 HC WOUND CARE DEBRIDEMENT SUPPLIES

## 2020-01-20 PROCEDURE — 11042 DBRDMT SUBQ TIS 1ST 20SQCM/<: CPT

## 2020-01-20 NOTE — ASSESSMENT & PLAN NOTE
Wound is improving with marked epithelialization.  Continue debridement to maintain active phase wound healing.  Continue offloading.

## 2020-01-20 NOTE — PROGRESS NOTES
[facility]  Wound Care  Progress Note    Problem List Items Addressed This Visit        Neuro    Paraplegia       Derm    Decubitus ulcer of left ischium, stage 4 - Primary    Overview     Location: Left ischial region  Duration: Since 11/22/2015  Context: Decubitus ulcer of the left ischial region  Patient developed new wound on the right ischial region noted 1/9/2017. The wound was thought to be related to tubing from the wound vac and pressure. This wound healed  Associated Signs and Symptoms: Patient has no pain. He is insensate   Modifying Factors: The patient continues to smoke despite recommendations to stop. He has made no progress.  The wound vac was Utilized until January 9, 2017 on the left ischial region.  The patient completed 3 months therapy of antibiotic compound on 1/15/2018.  The patient sits from 5 am to 8 pm daily in a wheelchair.  Patient indicates he has not had any additional time of sitting.  On 416/2018, the patient was noted to have increased drainage and odor from his wound.  There was concern about infection and a culture was taken 4/16/2018 revealing methicillin sensitive staph aureus. Patient completed a course of Bactrim for 10 days.   Patient's wound noted to have more drainage and odor on 6/18/2018. Culture was taken to switch to topical antimicrobial to decrease colonization of wound bed. Topical antibiotic was not approved.  Repeat DNA analysis was performed of wound exudate and as of 12/10/2018.  Patient utilized a course of topical antibiotic starting 02/18/2019.  Patient recently started Dakin solution due to increased drainage and odor.  Drainage and odor has resolved as of 08/26/2019.  Patient was changed to silver dressing. Patient presented several weeks ago with recurrence drainage and odor.  Dakin's solution was restarted 9/ 9 /19.  Wound in drainage improved since starting Dakin's.  Patient however returns today with increased drainage and odor since  changing from daily dressing changes to every other day dressing changes.  The wound bed however remains fairly clean with no evidence of necrosis exposed bone or significant slough.  There is no sign of infection.  Dakin's solution was stopped last visit and he was switched to silver alginate.  The jerry wound area is much improved since the drainage has decreased.  The wound bed remains clean with minimal slough.  The wound is granulating with no exposed bone or sign of infection.  Will continue in use silver dressing         Current Assessment & Plan     Wound is improving with marked epithelialization.  Continue debridement to maintain active phase wound healing.  Continue offloading.            Other    Cigarette smoker    Current Assessment & Plan     Patient continues to smoke.  He was reminded to stop smoking once again.  He is not interested in stopping.               See wound doc progress notes. Documents will be scanned.        Gilmar Christopher  [facility]

## 2020-01-20 NOTE — ASSESSMENT & PLAN NOTE
Patient continues to smoke.  He was reminded to stop smoking once again.  He is not interested in stopping.

## 2020-02-03 ENCOUNTER — OFFICE VISIT (OUTPATIENT)
Dept: WOUND CARE | Facility: HOSPITAL | Age: 65
End: 2020-02-03
Attending: SURGERY
Payer: COMMERCIAL

## 2020-02-03 VITALS — TEMPERATURE: 97 F | DIASTOLIC BLOOD PRESSURE: 83 MMHG | SYSTOLIC BLOOD PRESSURE: 160 MMHG | HEART RATE: 41 BPM

## 2020-02-03 DIAGNOSIS — G82.20 PARAPLEGIA: ICD-10-CM

## 2020-02-03 DIAGNOSIS — L89.324 DECUBITUS ULCER OF LEFT ISCHIUM, STAGE 4: Primary | ICD-10-CM

## 2020-02-03 DIAGNOSIS — F17.210 CIGARETTE SMOKER: ICD-10-CM

## 2020-02-03 PROBLEM — L97.912 CHRONIC ULCER OF RIGHT LOWER EXTREMITY WITH FAT LAYER EXPOSED: Status: RESOLVED | Noted: 2019-06-10 | Resolved: 2020-02-03

## 2020-02-03 PROCEDURE — 11042 DBRDMT SUBQ TIS 1ST 20SQCM/<: CPT

## 2020-02-03 PROCEDURE — 27201912 HC WOUND CARE DEBRIDEMENT SUPPLIES

## 2020-02-03 PROCEDURE — 99499 UNLISTED E&M SERVICE: CPT | Mod: ,,, | Performed by: SURGERY

## 2020-02-03 PROCEDURE — 99499 NO LOS: ICD-10-PCS | Mod: ,,, | Performed by: SURGERY

## 2020-02-03 NOTE — PROGRESS NOTES
[facility]  Wound Care  Progress Note    Problem List Items Addressed This Visit        Neuro    Paraplegia       Derm    Decubitus ulcer of left ischium, stage 4 - Primary    Overview     Location: Left ischial region  Duration: Since 11/22/2015  Context: Decubitus ulcer of the left ischial region  Patient developed new wound on the right ischial region noted 1/9/2017. The wound was thought to be related to tubing from the wound vac and pressure. This wound healed  Associated Signs and Symptoms: Patient has no pain. He is insensate   Modifying Factors: The patient continues to smoke despite recommendations to stop. He has made no progress.  The wound vac was Utilized until January 9, 2017 on the left ischial region.  The patient completed 3 months therapy of antibiotic compound on 1/15/2018.  The patient sits from 5 am to 8 pm daily in a wheelchair.  Patient indicates he has not had any additional time of sitting.  On 416/2018, the patient was noted to have increased drainage and odor from his wound.  There was concern about infection and a culture was taken 4/16/2018 revealing methicillin sensitive staph aureus. Patient completed a course of Bactrim for 10 days.   Patient's wound noted to have more drainage and odor on 6/18/2018. Culture was taken to switch to topical antimicrobial to decrease colonization of wound bed. Topical antibiotic was not approved.  Repeat DNA analysis was performed of wound exudate and as of 12/10/2018.  Patient utilized a course of topical antibiotic starting 02/18/2019.  Patient recently started Dakin solution due to increased drainage and odor.  Drainage and odor has resolved as of 08/26/2019.  Patient was changed to silver dressing. Patient presented several weeks ago with recurrence drainage and odor.  Dakin's solution was restarted 9/ 9 /19.  Wound in drainage improved since starting Dakin's.  Patient however returns today with increased drainage and odor since  changing from daily dressing changes to every other day dressing changes.  The wound bed however remains fairly clean with no evidence of necrosis exposed bone or significant slough.  There is no sign of infection.  Dakin's solution was stopped last visit and he was switched to silver alginate.  The jerry wound area is much improved since the drainage has decreased.  The wound is granulating with no exposed bone or sign of infection.          Current Assessment & Plan     There continues to be advancement of epithelium.  There is an indention present.  Continue debridement to maintain active phase wound healing continue silver dressing.            Other    Cigarette smoker    Current Assessment & Plan     Patient instructed to stop smoking               See wound doc progress notes. Documents will be scanned.        Gilmar SHERIDAN Christopher  [facility]

## 2020-02-03 NOTE — ASSESSMENT & PLAN NOTE
There continues to be advancement of epithelium.  There is an indention present.  Continue debridement to maintain active phase wound healing continue silver dressing.

## 2020-03-09 ENCOUNTER — OFFICE VISIT (OUTPATIENT)
Dept: WOUND CARE | Facility: HOSPITAL | Age: 65
End: 2020-03-09
Attending: SURGERY
Payer: COMMERCIAL

## 2020-03-09 VITALS
TEMPERATURE: 97 F | SYSTOLIC BLOOD PRESSURE: 176 MMHG | DIASTOLIC BLOOD PRESSURE: 85 MMHG | HEART RATE: 42 BPM | RESPIRATION RATE: 20 BRPM

## 2020-03-09 DIAGNOSIS — L89.324 DECUBITUS ULCER OF LEFT ISCHIUM, STAGE 4: ICD-10-CM

## 2020-03-09 DIAGNOSIS — T24.322A: ICD-10-CM

## 2020-03-09 PROCEDURE — 27201912 HC WOUND CARE DEBRIDEMENT SUPPLIES

## 2020-03-09 PROCEDURE — 99213 OFFICE O/P EST LOW 20 MIN: CPT

## 2020-03-09 PROCEDURE — 99499 UNLISTED E&M SERVICE: CPT | Mod: ,,, | Performed by: SURGERY

## 2020-03-09 PROCEDURE — 11042 DBRDMT SUBQ TIS 1ST 20SQCM/<: CPT

## 2020-03-09 PROCEDURE — 99499 NO LOS: ICD-10-PCS | Mod: ,,, | Performed by: SURGERY
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
## 2020-03-09 NOTE — ASSESSMENT & PLAN NOTE
Wound is improved.  There is significant epithelialization bridging across the wound bed.  There is granulation tissue.  There is no exposed bone.  There is no sign of infection.  Continue offloading.  Continue silver dressing.  Continue debridement to maintain active phase in wound healing

## 2020-03-23 ENCOUNTER — OFFICE VISIT (OUTPATIENT)
Dept: WOUND CARE | Facility: HOSPITAL | Age: 65
End: 2020-03-23
Attending: SURGERY
Payer: COMMERCIAL

## 2020-03-23 VITALS
TEMPERATURE: 97 F | HEART RATE: 44 BPM | DIASTOLIC BLOOD PRESSURE: 85 MMHG | SYSTOLIC BLOOD PRESSURE: 155 MMHG | RESPIRATION RATE: 21 BRPM

## 2020-03-23 DIAGNOSIS — L89.324 DECUBITUS ULCER OF LEFT ISCHIUM, STAGE 4: ICD-10-CM

## 2020-03-23 DIAGNOSIS — T24.322D: ICD-10-CM

## 2020-03-23 DIAGNOSIS — T24.221D PARTIAL THICKNESS BURN OF RIGHT KNEE, SUBSEQUENT ENCOUNTER: ICD-10-CM

## 2020-03-23 PROBLEM — T24.221A: Status: ACTIVE | Noted: 2020-03-23

## 2020-03-23 PROCEDURE — 99213 OFFICE O/P EST LOW 20 MIN: CPT | Mod: 25

## 2020-03-23 PROCEDURE — 99499 NO LOS: ICD-10-PCS | Mod: ,,, | Performed by: SURGERY

## 2020-03-23 PROCEDURE — 99499 UNLISTED E&M SERVICE: CPT | Mod: ,,, | Performed by: SURGERY

## 2020-03-23 PROCEDURE — 16020 DRESS/DEBRID P-THICK BURN S: CPT

## 2020-03-23 PROCEDURE — 11042 DBRDMT SUBQ TIS 1ST 20SQCM/<: CPT

## 2020-03-23 PROCEDURE — 27201912 HC WOUND CARE DEBRIDEMENT SUPPLIES

## 2020-03-23 NOTE — ASSESSMENT & PLAN NOTE
Wound remains with large eschar.  This was partially removed today with a scalpel blade.  There is no sign of infection.  Continue offloading.  Continue Santyl.  Continue sharp debridement is needed.
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
## 2020-03-23 NOTE — ASSESSMENT & PLAN NOTE
Patient's wound remains granulating with minimal slough present.  There is no exposed bone.  There is some increased drainage and odor.  There is no sign of soft tissue infection.  Would like to change to Dakin solution daily but daily dressing changes are not possible at this time.  We will continue silver dressing changes.  Patient instructed to clean wound with Dakin solution with dressing changes.

## 2020-04-06 ENCOUNTER — OFFICE VISIT (OUTPATIENT)
Dept: WOUND CARE | Facility: HOSPITAL | Age: 65
End: 2020-04-06
Attending: SURGERY
Payer: COMMERCIAL

## 2020-04-06 VITALS
RESPIRATION RATE: 22 BRPM | TEMPERATURE: 98 F | DIASTOLIC BLOOD PRESSURE: 93 MMHG | SYSTOLIC BLOOD PRESSURE: 120 MMHG | HEART RATE: 58 BPM

## 2020-04-06 DIAGNOSIS — L89.324 DECUBITUS ULCER OF LEFT ISCHIUM, STAGE 4: ICD-10-CM

## 2020-04-06 DIAGNOSIS — L89.893 PRESSURE INJURY OF LEFT KNEE, STAGE 3: ICD-10-CM

## 2020-04-06 DIAGNOSIS — G82.20 PARAPLEGIA: ICD-10-CM

## 2020-04-06 DIAGNOSIS — R60.0 LOCALIZED EDEMA: Primary | ICD-10-CM

## 2020-04-06 DIAGNOSIS — L89.893 PRESSURE INJURY OF RIGHT KNEE, STAGE 3: ICD-10-CM

## 2020-04-06 PROCEDURE — 99499 NO LOS: ICD-10-PCS | Mod: ,,, | Performed by: SURGERY

## 2020-04-06 PROCEDURE — 11042 DBRDMT SUBQ TIS 1ST 20SQCM/<: CPT

## 2020-04-06 PROCEDURE — 99499 UNLISTED E&M SERVICE: CPT | Mod: ,,, | Performed by: SURGERY

## 2020-04-06 PROCEDURE — 11045 DBRDMT SUBQ TISS EACH ADDL: CPT

## 2020-04-06 PROCEDURE — 27201912 HC WOUND CARE DEBRIDEMENT SUPPLIES

## 2020-04-06 PROCEDURE — 97597 DBRDMT OPN WND 1ST 20 CM/<: CPT

## 2020-04-06 PROCEDURE — 99214 OFFICE O/P EST MOD 30 MIN: CPT | Mod: 25
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
## 2020-04-06 NOTE — ASSESSMENT & PLAN NOTE
Wound is worsened.  Necrotic skin the subcutaneous tissue was removed.  Begin Mesalt.  Offloading with pillow between the legs.

## 2020-04-06 NOTE — ASSESSMENT & PLAN NOTE
Patient has worsening edema of 2 to 3+ in his lower extremities.  Patient was instructed to elevate his legs at night and throughout the day.  He has a leg elevating mechanism on his wheelchair that can be utilized.

## 2020-04-20 ENCOUNTER — OFFICE VISIT (OUTPATIENT)
Dept: WOUND CARE | Facility: HOSPITAL | Age: 65
End: 2020-04-20
Attending: SURGERY
Payer: COMMERCIAL

## 2020-04-20 VITALS
TEMPERATURE: 97 F | HEART RATE: 68 BPM | RESPIRATION RATE: 20 BRPM | DIASTOLIC BLOOD PRESSURE: 92 MMHG | SYSTOLIC BLOOD PRESSURE: 130 MMHG

## 2020-04-20 DIAGNOSIS — R60.0 LOCALIZED EDEMA: ICD-10-CM

## 2020-04-20 DIAGNOSIS — G82.20 PARAPLEGIA: ICD-10-CM

## 2020-04-20 DIAGNOSIS — L97.511 ULCER OF TOE OF RIGHT FOOT, LIMITED TO BREAKDOWN OF SKIN: ICD-10-CM

## 2020-04-20 DIAGNOSIS — L89.893 PRESSURE INJURY OF RIGHT KNEE, STAGE 3: ICD-10-CM

## 2020-04-20 DIAGNOSIS — L89.324 DECUBITUS ULCER OF LEFT ISCHIUM, STAGE 4: Primary | ICD-10-CM

## 2020-04-20 DIAGNOSIS — F17.210 CIGARETTE SMOKER: ICD-10-CM

## 2020-04-20 DIAGNOSIS — L89.893 PRESSURE INJURY OF LEFT KNEE, STAGE 3: ICD-10-CM

## 2020-04-20 DIAGNOSIS — E11.621 TYPE 2 DIABETES MELLITUS WITH FOOT ULCER (CODE): ICD-10-CM

## 2020-04-20 PROCEDURE — 11045 DBRDMT SUBQ TISS EACH ADDL: CPT

## 2020-04-20 PROCEDURE — 11042 DBRDMT SUBQ TIS 1ST 20SQCM/<: CPT

## 2020-04-20 PROCEDURE — 27201912 HC WOUND CARE DEBRIDEMENT SUPPLIES

## 2020-04-20 PROCEDURE — 99499 UNLISTED E&M SERVICE: CPT | Mod: ,,, | Performed by: SURGERY

## 2020-04-20 PROCEDURE — 99499 NO LOS: ICD-10-PCS | Mod: ,,, | Performed by: SURGERY

## 2020-04-20 NOTE — ASSESSMENT & PLAN NOTE
Excisional debridement of performed of necrotic tissue.  Continue offloading with pillow between legs.
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
## 2020-04-20 NOTE — ASSESSMENT & PLAN NOTE
Wounds continue to worsen as further necrotic tissue is removed.  Continue debridement to remove necrotic tissue.  Continue offloading between legs.

## 2020-04-20 NOTE — ASSESSMENT & PLAN NOTE
Wound has improved.  There is no odor.  Continue debridement to maintain active phase wound healing.  Continue Dakin solution.  Continue offloading.

## 2020-05-04 ENCOUNTER — OFFICE VISIT (OUTPATIENT)
Dept: WOUND CARE | Facility: HOSPITAL | Age: 65
End: 2020-05-04
Attending: SURGERY
Payer: COMMERCIAL

## 2020-05-04 VITALS — SYSTOLIC BLOOD PRESSURE: 149 MMHG | TEMPERATURE: 98 F | DIASTOLIC BLOOD PRESSURE: 88 MMHG | HEART RATE: 50 BPM

## 2020-05-04 DIAGNOSIS — L97.511 ULCER OF TOE OF RIGHT FOOT, LIMITED TO BREAKDOWN OF SKIN: ICD-10-CM

## 2020-05-04 DIAGNOSIS — G82.20 PARAPLEGIA: ICD-10-CM

## 2020-05-04 DIAGNOSIS — L89.893 PRESSURE INJURY OF RIGHT KNEE, STAGE 3: ICD-10-CM

## 2020-05-04 DIAGNOSIS — R60.0 LOCALIZED EDEMA: ICD-10-CM

## 2020-05-04 DIAGNOSIS — L89.893 PRESSURE INJURY OF LEFT KNEE, STAGE 3: ICD-10-CM

## 2020-05-04 DIAGNOSIS — L89.324 DECUBITUS ULCER OF LEFT ISCHIUM, STAGE 4: Primary | ICD-10-CM

## 2020-05-04 PROCEDURE — 99499 NO LOS: ICD-10-PCS | Mod: ,,, | Performed by: SURGERY

## 2020-05-04 PROCEDURE — 27201912 HC WOUND CARE DEBRIDEMENT SUPPLIES

## 2020-05-04 PROCEDURE — 11042 DBRDMT SUBQ TIS 1ST 20SQCM/<: CPT

## 2020-05-04 PROCEDURE — 99499 UNLISTED E&M SERVICE: CPT | Mod: ,,, | Performed by: SURGERY

## 2020-05-04 PROCEDURE — 11045 DBRDMT SUBQ TISS EACH ADDL: CPT
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
## 2020-05-04 NOTE — ASSESSMENT & PLAN NOTE
No significant change in the wound.  Patient continues to sit for longer period than desired.  Continue debridement to maintain active phase wound healing.  Patient again counseled on offloading.

## 2020-05-04 NOTE — ASSESSMENT & PLAN NOTE
Patient continues with lower extremity edema with limited improvement.  Continue leg elevation and limited sitting.

## 2020-05-04 NOTE — ASSESSMENT & PLAN NOTE
Wound is much improved with less necrotic tissue.  There is still some necrotic fat which was debrided.  Continue debridement to remove necrotic tissue and maintain active phase wound healing.  Continue Dakin's solution.  Continue offloading.

## 2020-05-18 ENCOUNTER — OFFICE VISIT (OUTPATIENT)
Dept: WOUND CARE | Facility: HOSPITAL | Age: 65
End: 2020-05-18
Attending: SURGERY
Payer: COMMERCIAL

## 2020-05-18 VITALS — HEART RATE: 62 BPM | SYSTOLIC BLOOD PRESSURE: 147 MMHG | TEMPERATURE: 98 F | DIASTOLIC BLOOD PRESSURE: 96 MMHG

## 2020-05-18 DIAGNOSIS — L89.894: ICD-10-CM

## 2020-05-18 DIAGNOSIS — L89.894 PRESSURE INJURY OF RIGHT KNEE, STAGE 4: ICD-10-CM

## 2020-05-18 DIAGNOSIS — L89.324 DECUBITUS ULCER OF LEFT ISCHIUM, STAGE 4: Primary | ICD-10-CM

## 2020-05-18 DIAGNOSIS — G82.20 PARAPLEGIA: ICD-10-CM

## 2020-05-18 DIAGNOSIS — R60.0 LOCALIZED EDEMA: ICD-10-CM

## 2020-05-18 PROBLEM — L97.511 ULCER OF TOE OF RIGHT FOOT, LIMITED TO BREAKDOWN OF SKIN: Status: RESOLVED | Noted: 2020-04-20 | Resolved: 2020-05-18

## 2020-05-18 PROCEDURE — 11043 DBRDMT MUSC&/FSCA 1ST 20/<: CPT

## 2020-05-18 PROCEDURE — 27201912 HC WOUND CARE DEBRIDEMENT SUPPLIES

## 2020-05-18 PROCEDURE — 99499 NO LOS: ICD-10-PCS | Mod: ,,, | Performed by: SURGERY

## 2020-05-18 PROCEDURE — 11042 DBRDMT SUBQ TIS 1ST 20SQCM/<: CPT

## 2020-05-18 PROCEDURE — 99499 UNLISTED E&M SERVICE: CPT | Mod: ,,, | Performed by: SURGERY

## 2020-05-18 PROCEDURE — 11046 DBRDMT MUSC&/FSCA EA ADDL: CPT
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
## 2020-05-18 NOTE — ASSESSMENT & PLAN NOTE
Wound is now stage IV is the involves fascia.  Wound is improving however.  Continue debridement to remove necrotic tissue and to maintain active phase wound healing.  Continue offloading.

## 2020-05-18 NOTE — ASSESSMENT & PLAN NOTE
The wound now extends to and involves fascia.  The wound overall is improving.  Continue debridement to maintain active phase wound healing and to remove necrotic tissue.  Continue offloading.

## 2020-05-18 NOTE — ASSESSMENT & PLAN NOTE
Patient does have some evidence of increased injury.  Continue debridement to remove nonviable tissue and maintain active phase wound healing.  Patient was consult regarding offloading and minimizing time sitting.

## 2020-06-01 ENCOUNTER — OFFICE VISIT (OUTPATIENT)
Dept: WOUND CARE | Facility: HOSPITAL | Age: 65
End: 2020-06-01
Attending: SURGERY
Payer: MEDICARE

## 2020-06-01 VITALS — TEMPERATURE: 98 F | SYSTOLIC BLOOD PRESSURE: 173 MMHG | HEART RATE: 51 BPM | DIASTOLIC BLOOD PRESSURE: 98 MMHG

## 2020-06-01 DIAGNOSIS — L89.894 PRESSURE INJURY OF RIGHT KNEE, STAGE 4: ICD-10-CM

## 2020-06-01 DIAGNOSIS — L89.894: ICD-10-CM

## 2020-06-01 DIAGNOSIS — L89.324 DECUBITUS ULCER OF LEFT ISCHIUM, STAGE 4: ICD-10-CM

## 2020-06-01 PROCEDURE — 27201912 HC WOUND CARE DEBRIDEMENT SUPPLIES

## 2020-06-01 PROCEDURE — 99499 UNLISTED E&M SERVICE: CPT | Mod: ,,, | Performed by: SURGERY

## 2020-06-01 PROCEDURE — 11045 DBRDMT SUBQ TISS EACH ADDL: CPT

## 2020-06-01 PROCEDURE — 11042 DBRDMT SUBQ TIS 1ST 20SQCM/<: CPT

## 2020-06-01 PROCEDURE — 99499 NO LOS: ICD-10-PCS | Mod: ,,, | Performed by: SURGERY
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
## 2020-06-01 NOTE — ASSESSMENT & PLAN NOTE
There is some exposed necrotic tendon.  This was excised with the scissors.  Continue Dakin solution.  Continue offloading.  Continue sharp debridement is needed.

## 2020-06-01 NOTE — ASSESSMENT & PLAN NOTE
Wound bed is stable.  There is the area at the edge of the wound which has some pressure necrosis..  No sign of infection.  No exposed bone.  Continue offloading.  Continue silver alginate.

## 2020-06-01 NOTE — ASSESSMENT & PLAN NOTE
Wound is clean and granulating.  There is some exposed fascia.  Continue offloading.  Continue Dakin solution.  Continue debridement to maintain active phase of wound healing and to remove necrotic tissue.

## 2020-06-15 ENCOUNTER — OFFICE VISIT (OUTPATIENT)
Dept: WOUND CARE | Facility: HOSPITAL | Age: 65
End: 2020-06-15
Attending: SURGERY
Payer: COMMERCIAL

## 2020-06-15 VITALS — SYSTOLIC BLOOD PRESSURE: 167 MMHG | HEART RATE: 44 BPM | DIASTOLIC BLOOD PRESSURE: 91 MMHG | TEMPERATURE: 97 F

## 2020-06-15 DIAGNOSIS — L89.894 PRESSURE INJURY OF RIGHT KNEE, STAGE 4: ICD-10-CM

## 2020-06-15 DIAGNOSIS — L89.894: ICD-10-CM

## 2020-06-15 DIAGNOSIS — L89.324 DECUBITUS ULCER OF LEFT ISCHIUM, STAGE 4: ICD-10-CM

## 2020-06-15 PROCEDURE — 27201912 HC WOUND CARE DEBRIDEMENT SUPPLIES

## 2020-06-15 PROCEDURE — 11042 DBRDMT SUBQ TIS 1ST 20SQCM/<: CPT

## 2020-06-15 PROCEDURE — 99499 NO LOS: ICD-10-PCS | Mod: ,,, | Performed by: SURGERY

## 2020-06-15 PROCEDURE — 11045 DBRDMT SUBQ TISS EACH ADDL: CPT

## 2020-06-15 PROCEDURE — 99499 UNLISTED E&M SERVICE: CPT | Mod: ,,, | Performed by: SURGERY

## 2020-06-15 NOTE — ASSESSMENT & PLAN NOTE
Wound remains clean and granulating.  There remains some exposed fascia.  There is epithelialization at the wound edges.  The continue debridement to maintain active phase of wound healing.  Implement silver dressing.
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
## 2020-06-15 NOTE — ASSESSMENT & PLAN NOTE
Wound remains stable.  Minimal wound nonviable tissue.  Decreased drainage.  Bone remains covered by granulation tissue.  Continue offloading.  Continue debridement is needed.  Continue silver alginate.

## 2020-06-15 NOTE — ASSESSMENT & PLAN NOTE
Wounds remained clean.  Wounds granulating.  No sign of infection.  Continue debridement to maintain active phase of wound healing.  The permanent silver dressing.

## 2020-06-29 ENCOUNTER — OFFICE VISIT (OUTPATIENT)
Dept: WOUND CARE | Facility: HOSPITAL | Age: 65
End: 2020-06-29
Attending: SURGERY
Payer: COMMERCIAL

## 2020-06-29 VITALS — HEART RATE: 65 BPM | DIASTOLIC BLOOD PRESSURE: 70 MMHG | SYSTOLIC BLOOD PRESSURE: 116 MMHG | TEMPERATURE: 98 F

## 2020-06-29 DIAGNOSIS — G82.20 PARAPLEGIA: ICD-10-CM

## 2020-06-29 DIAGNOSIS — L89.894: ICD-10-CM

## 2020-06-29 DIAGNOSIS — L89.324 DECUBITUS ULCER OF LEFT ISCHIUM, STAGE 4: Primary | ICD-10-CM

## 2020-06-29 DIAGNOSIS — L89.894 PRESSURE INJURY OF RIGHT KNEE, STAGE 4: ICD-10-CM

## 2020-06-29 PROBLEM — E11.621 TYPE 2 DIABETES MELLITUS WITH FOOT ULCER (CODE): Status: RESOLVED | Noted: 2020-04-20 | Resolved: 2020-06-29

## 2020-06-29 PROCEDURE — 27201912 HC WOUND CARE DEBRIDEMENT SUPPLIES

## 2020-06-29 PROCEDURE — 11043 DBRDMT MUSC&/FSCA 1ST 20/<: CPT

## 2020-06-29 PROCEDURE — 11042 DBRDMT SUBQ TIS 1ST 20SQCM/<: CPT

## 2020-06-29 PROCEDURE — 99499 UNLISTED E&M SERVICE: CPT | Mod: ,,, | Performed by: SURGERY

## 2020-06-29 PROCEDURE — 99499 NO LOS: ICD-10-PCS | Mod: ,,, | Performed by: SURGERY
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
## 2020-06-29 NOTE — ASSESSMENT & PLAN NOTE
No significant changes in the wound.  Continue debridement to maintain active phase wound healing.  Continue offloading.

## 2020-07-20 ENCOUNTER — OFFICE VISIT (OUTPATIENT)
Dept: WOUND CARE | Facility: HOSPITAL | Age: 65
End: 2020-07-20
Attending: SURGERY
Payer: COMMERCIAL

## 2020-07-20 VITALS — HEART RATE: 47 BPM | TEMPERATURE: 98 F | SYSTOLIC BLOOD PRESSURE: 148 MMHG | DIASTOLIC BLOOD PRESSURE: 104 MMHG

## 2020-07-20 DIAGNOSIS — L89.894: ICD-10-CM

## 2020-07-20 DIAGNOSIS — L89.324 DECUBITUS ULCER OF LEFT ISCHIUM, STAGE 4: ICD-10-CM

## 2020-07-20 DIAGNOSIS — L89.894 PRESSURE INJURY OF RIGHT KNEE, STAGE 4: ICD-10-CM

## 2020-07-20 PROCEDURE — 11043 DBRDMT MUSC&/FSCA 1ST 20/<: CPT

## 2020-07-20 PROCEDURE — 27201912 HC WOUND CARE DEBRIDEMENT SUPPLIES

## 2020-07-20 PROCEDURE — 11042 DBRDMT SUBQ TIS 1ST 20SQCM/<: CPT

## 2020-07-20 PROCEDURE — 99499 NO LOS: ICD-10-PCS | Mod: ,,, | Performed by: SURGERY

## 2020-07-20 PROCEDURE — 99499 UNLISTED E&M SERVICE: CPT | Mod: ,,, | Performed by: SURGERY

## 2020-07-20 NOTE — ASSESSMENT & PLAN NOTE
Wounds remained clean and granulating.  No sign of infection.  Continued sharp debridement is needed.  Continue current dressing changes.

## 2020-07-20 NOTE — ASSESSMENT & PLAN NOTE
Wound is clean and granulating.  Minimal slough present.  Continued sharp debridement is needed.  Offload.  Continue current dressing.
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
## 2020-07-20 NOTE — ASSESSMENT & PLAN NOTE
Wound appears clean.  No significant drainage.  Wound granulating.  No signs of pressure.  No exposed bone.  Minimal slough present.  Continue offloading.  Continue silver alginate.  Continue sharp debridement is needed.

## 2020-08-03 ENCOUNTER — OFFICE VISIT (OUTPATIENT)
Dept: WOUND CARE | Facility: HOSPITAL | Age: 65
End: 2020-08-03
Attending: SURGERY
Payer: COMMERCIAL

## 2020-08-03 VITALS — TEMPERATURE: 98 F | SYSTOLIC BLOOD PRESSURE: 111 MMHG | HEART RATE: 80 BPM | DIASTOLIC BLOOD PRESSURE: 72 MMHG

## 2020-08-03 DIAGNOSIS — L89.893 PRESSURE ULCER OF LOWER EXTREMITY, STAGE III: ICD-10-CM

## 2020-08-03 DIAGNOSIS — L89.894: ICD-10-CM

## 2020-08-03 DIAGNOSIS — L89.324 DECUBITUS ULCER OF LEFT ISCHIUM, STAGE 4: ICD-10-CM

## 2020-08-03 DIAGNOSIS — L89.894 PRESSURE INJURY OF RIGHT KNEE, STAGE 4: ICD-10-CM

## 2020-08-03 PROCEDURE — 11042 DBRDMT SUBQ TIS 1ST 20SQCM/<: CPT

## 2020-08-03 PROCEDURE — 27201912 HC WOUND CARE DEBRIDEMENT SUPPLIES

## 2020-08-03 PROCEDURE — 99499 NO LOS: ICD-10-PCS | Mod: ,,, | Performed by: SURGERY

## 2020-08-03 PROCEDURE — 99499 UNLISTED E&M SERVICE: CPT | Mod: ,,, | Performed by: SURGERY

## 2020-08-03 PROCEDURE — 11045 DBRDMT SUBQ TISS EACH ADDL: CPT
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
## 2020-08-03 NOTE — ASSESSMENT & PLAN NOTE
Wounds remained clean and granulating.  Continue offloading.  Continue sharp debridement is needed.

## 2020-08-17 ENCOUNTER — OFFICE VISIT (OUTPATIENT)
Dept: WOUND CARE | Facility: HOSPITAL | Age: 65
End: 2020-08-17
Attending: SURGERY
Payer: COMMERCIAL

## 2020-08-17 VITALS — HEART RATE: 82 BPM | TEMPERATURE: 98 F | SYSTOLIC BLOOD PRESSURE: 138 MMHG | DIASTOLIC BLOOD PRESSURE: 78 MMHG

## 2020-08-17 DIAGNOSIS — L89.324 DECUBITUS ULCER OF LEFT ISCHIUM, STAGE 4: ICD-10-CM

## 2020-08-17 DIAGNOSIS — L89.894: ICD-10-CM

## 2020-08-17 DIAGNOSIS — L89.894 PRESSURE INJURY OF RIGHT KNEE, STAGE 4: ICD-10-CM

## 2020-08-17 PROCEDURE — 27201912 HC WOUND CARE DEBRIDEMENT SUPPLIES

## 2020-08-17 PROCEDURE — 99499 UNLISTED E&M SERVICE: CPT | Mod: ,,, | Performed by: SURGERY

## 2020-08-17 PROCEDURE — 11042 DBRDMT SUBQ TIS 1ST 20SQCM/<: CPT

## 2020-08-17 PROCEDURE — 11045 DBRDMT SUBQ TISS EACH ADDL: CPT

## 2020-08-17 PROCEDURE — 99499 NO LOS: ICD-10-PCS | Mod: ,,, | Performed by: SURGERY

## 2020-08-17 NOTE — ASSESSMENT & PLAN NOTE
Wound fairly clean and granulating.  There is a wound at the lower extremity which has some eschar.  This was removed.  There was some necrotic fat which was also excised.  Beneath this there is healthy tissue.  No signs of infection.  Patient instructed to place pillow between the legs for offloading.

## 2020-08-17 NOTE — ASSESSMENT & PLAN NOTE
Wound fairly clean.  Minimal slough present.  Continue sharp debridement is needed.  Continue current dressing changes.  Patient needs better offloading.  Patient sleeps on his side.  Patient instructed to place several pillows between his legs.
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
## 2020-08-17 NOTE — ASSESSMENT & PLAN NOTE
Wound remains clean.  No significant drainage.  No sign of infection.  No exposed bone.  Periwound area without maceration.  Continue offloading and current dressing change.

## 2020-10-05 ENCOUNTER — OFFICE VISIT (OUTPATIENT)
Dept: WOUND CARE | Facility: HOSPITAL | Age: 65
End: 2020-10-05
Attending: SURGERY
Payer: MEDICARE

## 2020-10-05 VITALS — HEART RATE: 64 BPM | DIASTOLIC BLOOD PRESSURE: 48 MMHG | SYSTOLIC BLOOD PRESSURE: 95 MMHG

## 2020-10-05 DIAGNOSIS — L89.893 PRESSURE ULCER OF LOWER EXTREMITY, STAGE III: ICD-10-CM

## 2020-10-05 DIAGNOSIS — G82.20 PARAPLEGIA: Primary | ICD-10-CM

## 2020-10-05 DIAGNOSIS — L89.894: ICD-10-CM

## 2020-10-05 DIAGNOSIS — L89.324 DECUBITUS ULCER OF LEFT ISCHIUM, STAGE 4: ICD-10-CM

## 2020-10-05 DIAGNOSIS — L89.894 PRESSURE INJURY OF RIGHT KNEE, STAGE 4: ICD-10-CM

## 2020-10-05 DIAGNOSIS — L89.892 PRESSURE ULCER OF OTHER SITE, STAGE 2: ICD-10-CM

## 2020-10-05 PROCEDURE — 97597 DBRDMT OPN WND 1ST 20 CM/<: CPT

## 2020-10-05 PROCEDURE — 99499 UNLISTED E&M SERVICE: CPT | Mod: ,,, | Performed by: SURGERY

## 2020-10-05 PROCEDURE — 99499 NO LOS: ICD-10-PCS | Mod: ,,, | Performed by: SURGERY

## 2020-10-05 PROCEDURE — 11042 DBRDMT SUBQ TIS 1ST 20SQCM/<: CPT

## 2020-10-05 PROCEDURE — 27201912 HC WOUND CARE DEBRIDEMENT SUPPLIES

## 2020-10-05 PROCEDURE — 97598 DBRDMT OPN WND ADDL 20CM/<: CPT

## 2020-10-05 PROCEDURE — 99213 OFFICE O/P EST LOW 20 MIN: CPT | Mod: 25

## 2020-10-05 PROCEDURE — 11045 DBRDMT SUBQ TISS EACH ADDL: CPT
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
## 2020-10-05 NOTE — ASSESSMENT & PLAN NOTE
Wounds have little change.  Excisional debridement performed to maintain active phase wound healing.  Patient has significant edema of his lower extremities but refuses to elevate his legs during the day.  Patient has been counseled once again on the importance of leg elevation.

## 2020-10-05 NOTE — ASSESSMENT & PLAN NOTE
Wound is had little change.  Patient is sitting for extended periods of time.  He does not wish to get in the bed despite instructions of the otherwise.  Continue debridement to maintain active phase wound healing.  Continue silver alginate.  Continue encourage compliance of offloading with getting in bed.  This is continued to be difficult due to patient refusal.

## 2020-11-02 ENCOUNTER — OFFICE VISIT (OUTPATIENT)
Dept: WOUND CARE | Facility: HOSPITAL | Age: 65
End: 2020-11-02
Attending: SURGERY
Payer: MEDICARE

## 2020-11-02 VITALS
TEMPERATURE: 98 F | HEART RATE: 53 BPM | RESPIRATION RATE: 20 BRPM | SYSTOLIC BLOOD PRESSURE: 137 MMHG | DIASTOLIC BLOOD PRESSURE: 81 MMHG

## 2020-11-02 DIAGNOSIS — L89.894 PRESSURE INJURY OF RIGHT KNEE, STAGE 4: ICD-10-CM

## 2020-11-02 DIAGNOSIS — L89.894: ICD-10-CM

## 2020-11-02 DIAGNOSIS — G82.20 PARAPLEGIA: Primary | ICD-10-CM

## 2020-11-02 DIAGNOSIS — L89.892 PRESSURE ULCER OF OTHER SITE, STAGE 2: ICD-10-CM

## 2020-11-02 DIAGNOSIS — L89.890: ICD-10-CM

## 2020-11-02 DIAGNOSIS — L89.520 PRESSURE ULCER OF LEFT ANKLE, UNSTAGEABLE: ICD-10-CM

## 2020-11-02 DIAGNOSIS — L89.324 DECUBITUS ULCER OF LEFT ISCHIUM, STAGE 4: ICD-10-CM

## 2020-11-02 DIAGNOSIS — L89.893 PRESSURE ULCER OF LOWER EXTREMITY, STAGE III: ICD-10-CM

## 2020-11-02 PROCEDURE — 11045 DBRDMT SUBQ TISS EACH ADDL: CPT

## 2020-11-02 PROCEDURE — 99499 NO LOS: ICD-10-PCS | Mod: ,,, | Performed by: SURGERY

## 2020-11-02 PROCEDURE — 99213 OFFICE O/P EST LOW 20 MIN: CPT | Mod: 25

## 2020-11-02 PROCEDURE — 11042 DBRDMT SUBQ TIS 1ST 20SQCM/<: CPT

## 2020-11-02 PROCEDURE — 99499 UNLISTED E&M SERVICE: CPT | Mod: ,,, | Performed by: SURGERY

## 2020-11-02 PROCEDURE — 27201912 HC WOUND CARE DEBRIDEMENT SUPPLIES

## 2020-11-02 PROCEDURE — 97597 DBRDMT OPN WND 1ST 20 CM/<: CPT
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
## 2020-11-02 NOTE — ASSESSMENT & PLAN NOTE
Wound slightly improved.  Continue debridement to maintain active phase wound healing.  Continue silver alginate.

## 2020-11-02 NOTE — ASSESSMENT & PLAN NOTE
Alcohol and Betadine to the left knee eschar.  Debridement once it softens.  Offloading with pillows and close monitoring of areas of pressure.

## 2020-11-02 NOTE — ASSESSMENT & PLAN NOTE
Begin Santyl for enzymatic debridement.  Offloading on monitoring for episodes of pressure and pillow use between legs.

## 2020-11-02 NOTE — ASSESSMENT & PLAN NOTE
No significant change.  There is no evidence of pressure.  Continue debridement to maintain active phase wound healing.  Continue alginate.

## 2020-11-16 ENCOUNTER — OFFICE VISIT (OUTPATIENT)
Dept: WOUND CARE | Facility: HOSPITAL | Age: 65
End: 2020-11-16
Attending: SURGERY
Payer: MEDICARE

## 2020-11-16 VITALS
HEART RATE: 86 BPM | RESPIRATION RATE: 20 BRPM | DIASTOLIC BLOOD PRESSURE: 82 MMHG | TEMPERATURE: 98 F | SYSTOLIC BLOOD PRESSURE: 140 MMHG

## 2020-11-16 DIAGNOSIS — L89.894: ICD-10-CM

## 2020-11-16 DIAGNOSIS — R60.0 LOCALIZED EDEMA: ICD-10-CM

## 2020-11-16 DIAGNOSIS — L97.511 NON-PRESSURE CHRONIC ULCER OF OTHER PART OF RIGHT FOOT LIMITED TO BREAKDOWN OF SKIN: ICD-10-CM

## 2020-11-16 DIAGNOSIS — L97.111: ICD-10-CM

## 2020-11-16 DIAGNOSIS — L89.520 PRESSURE ULCER OF LEFT ANKLE, UNSTAGEABLE: ICD-10-CM

## 2020-11-16 DIAGNOSIS — L97.811 NON-PRESSURE CHRONIC ULCER OF OTHER PART OF RIGHT LOWER LEG LIMITED TO BREAKDOWN OF SKIN: ICD-10-CM

## 2020-11-16 DIAGNOSIS — L89.894 PRESSURE INJURY OF RIGHT KNEE, STAGE 4: ICD-10-CM

## 2020-11-16 DIAGNOSIS — L97.521 ULCER OF LEFT FOOT, LIMITED TO BREAKDOWN OF SKIN: ICD-10-CM

## 2020-11-16 DIAGNOSIS — L89.892 PRESSURE ULCER OF OTHER SITE, STAGE 2: ICD-10-CM

## 2020-11-16 DIAGNOSIS — L89.324 DECUBITUS ULCER OF LEFT ISCHIUM, STAGE 4: ICD-10-CM

## 2020-11-16 DIAGNOSIS — L89.893 PRESSURE ULCER OF LOWER EXTREMITY, STAGE III: ICD-10-CM

## 2020-11-16 DIAGNOSIS — L89.890: ICD-10-CM

## 2020-11-16 PROCEDURE — 27201912 HC WOUND CARE DEBRIDEMENT SUPPLIES

## 2020-11-16 PROCEDURE — 99499 NO LOS: ICD-10-PCS | Mod: ,,, | Performed by: SURGERY

## 2020-11-16 PROCEDURE — 99499 UNLISTED E&M SERVICE: CPT | Mod: ,,, | Performed by: SURGERY

## 2020-11-16 PROCEDURE — 99214 OFFICE O/P EST MOD 30 MIN: CPT | Mod: 25

## 2020-11-16 PROCEDURE — 97598 DBRDMT OPN WND ADDL 20CM/<: CPT | Mod: 59

## 2020-11-16 PROCEDURE — 97597 DBRDMT OPN WND 1ST 20 CM/<: CPT

## 2020-11-16 PROCEDURE — 11045 DBRDMT SUBQ TISS EACH ADDL: CPT

## 2020-11-16 PROCEDURE — 11042 DBRDMT SUBQ TIS 1ST 20SQCM/<: CPT

## 2020-11-16 NOTE — ASSESSMENT & PLAN NOTE
Wound is stable with no sign worsening pressure.  Wound Healing is hampered by lower extremity edema.  Continue debridement to maintain active phase wound healing.  Continue Santyl.

## 2020-11-16 NOTE — PROGRESS NOTES
[facility]  Wound Care  Progress Note    Problem List Items Addressed This Visit        Derm    Decubitus ulcer of left ischium, stage 4    Overview     Location: Left ischial region  Duration: Since 11/22/2015  Context: Decubitus ulcer of the left ischial region  Patient developed new wound on the right ischial region noted 1/9/2017. The wound was thought to be related to tubing from the wound vac and pressure. This wound healed  Associated Signs and Symptoms: Patient has no pain. He is insensate   Modifying Factors: The patient continues to smoke despite recommendations to stop. He has made no progress.  The wound vac was Utilized until January 9, 2017 on the left ischial region.  The patient completed 3 months therapy of antibiotic compound on 1/15/2018.  The patient sits from 5 am to 8 pm daily in a wheelchair.  Patient indicates he has not had any additional time of sitting.  On 416/2018, the patient was noted to have increased drainage and odor from his wound.  There was concern about infection and a culture was taken 4/16/2018 revealing methicillin sensitive staph aureus. Patient completed a course of Bactrim for 10 days.   Patient's wound noted to have more drainage and odor on 6/18/2018. Culture was taken to switch to topical antimicrobial to decrease colonization of wound bed. Topical antibiotic was not approved.  Repeat DNA analysis was performed of wound exudate and as of 12/10/2018.  Patient utilized a course of topical antibiotic starting 02/18/2019.  Patient recently started Dakin solution due to increased drainage and odor.  Drainage and odor has resolved as of 08/26/2019.  Patient was changed to silver dressing. Patient presented several weeks ago with recurrence drainage and odor.  Dakin's solution was restarted 9/ 9 /19.  Wound in drainage improved since starting Dakin's. Dakin's solution was stopped and he was switched to silver alginate.    Dakin solution was restarted on  03/23/2020 due to increased drainage and no odor.  On 5/18/2020, silver alginate was restarted.         Current Assessment & Plan     Wound is stable with no sign worsening pressure.  Wound Healing is hampered by lower extremity edema.  Continue debridement to maintain active phase wound healing.  Continue Santyl.         Pressure ulcer of lower extremity, stage III    Overview     Patient has satellite wounds have progressed in turned into full-thickness wounds on the left lower extremity.  There is some necrotic tissue at the base of the wounds.  There is no sign of infection.         Current Assessment & Plan     Wound is stable with no sign worsening pressure.  Wound Healing is hampered by lower extremity edema.  Continue debridement to maintain active phase wound healing.  Continue Santyl.           Pressure ulcer of left thigh, stage 2    Overview     Patient presented with new wounds on his left thigh consistent with additional pressure wounds.  There was necrotic tissue present.  There was moderate amount of drainage.         Current Assessment & Plan     Wound is stable with no sign worsening pressure.  Wound Healing is hampered by lower extremity edema.  Continue debridement to maintain active phase wound healing.  Continue Santyl.           Pressure ulcer of left ankle, unstageable    Overview     Patient presented to wound clinic on 11/02/2020 with a new pressure ulcer to the left ankle.  The wound is unstageable.  There was black eschar and mild drainage.         Current Assessment & Plan     The patient continues with eschar to the wound.  There is no drainage.  No debridement required.          Decubitus ulcer of left knee, unstageable    Overview     Patient presented to wound clinic on 11/02/2020 with a new wound to the left medial knee.  There was thick eschar present without drainage.  The depth of injury is not known.         Current Assessment & Plan     The patient continues with eschar to the  wound.  There is no drainage.  No debridement required.          chronic ulcer of dorsum of right foot limited to breakdown of skin    Overview     Patient has developed significant edema of both legs.  He has been having edema for some time.  Edema has significantly worsened leading to numerous superficial ulcerations.         Current Assessment & Plan     Debridement of nonviable superficial tissue was performed.         Ulcer of dorsum of left foot, limited to breakdown of skin    Overview     Patient has developed significant edema of both legs.  He has been having edema for some time.  Edema has significantly worsened leading to numerous superficial ulcerations.           Current Assessment & Plan     Debridement of nonviable superficial tissue was performed.           chronic ulcer of other part of right lower leg limited to breakdown of skin    Overview     Patient has developed significant edema of both legs.  He has been having edema for some time.  Edema has significantly worsened leading to numerous superficial ulcerations.           Current Assessment & Plan     Debridement of nonviable superficial tissue was performed.           Chronic ulcer of right thigh limited to breakdown of skin    Overview     Patient has developed significant edema of both legs.  He has been having edema for some time.  Edema has significantly worsened leading to numerous superficial ulcerations.           Current Assessment & Plan     Debridement of nonviable superficial tissue was performed.              Other    Localized edema    Overview     Bilateral lower extremity         Current Assessment & Plan     Patient has marked worsening of his lower extremity edema.  This is not contributing to wound formation.  He appears to also have volume overload with likely volume of fluid within his abdomen.  I have recommended hospital admission for management of his volume overload.  He has agreed.  I have contacted his primary  physician Dr. James Taylor and she will admit the patient for management.         Pressure injury of left knee, stage 4    Overview     Patient presented with black eschar to the left knee.  Patient is unaware due to his paraplegia.  [facility]  The area is dry with black eschar no drainage or sign of infection.  As of 4/6/2020, wound worsened extending further onto lower leg consistent with a pressure ulcer from sleeping with knees together.  Patient instructed to sleep with pillow between legs for offloading.  As of 05/18/2020, a portion of the wound was noted to extend into and involve fascia on the medial superior lower leg.         Current Assessment & Plan     Wound is stable with no sign worsening pressure.  Wound Healing is hampered by lower extremity edema.  Continue debridement to maintain active phase wound healing.  Continue Santyl.           Pressure injury of right knee, stage 4    Overview     Patient presents today with a new burn wound to the right knee.  There is also a satellite area.  There is no sign of infection.  Wound appears to be partial thickness.  We will implement Santyl dressing.  As of 4/6/2020, wound worsened with increased eschar consistent with a pressure ulcer from sleeping with knees together.  Patient instructed to sleep with pillow between legs for offloading.  As of 05/18/2020, the wound extends to and involves fascia.         Current Assessment & Plan     Wound is stable with no sign worsening pressure.  Wound Healing is hampered by lower extremity edema.  Continue debridement to maintain active phase wound healing.  Continue Santyl.                 See wound doc progress notes. Documents will be scanned.        Gilmar SHERIDAN Hebert [facility]

## 2020-11-16 NOTE — H&P
[facility]  Wound Care  History and Physical    Problem List Items Addressed This Visit        Derm    Decubitus ulcer of left ischium, stage 4    Overview     Location: Left ischial region  Duration: Since 11/22/2015  Context: Decubitus ulcer of the left ischial region  Patient developed new wound on the right ischial region noted 1/9/2017. The wound was thought to be related to tubing from the wound vac and pressure. This wound healed  Associated Signs and Symptoms: Patient has no pain. He is insensate   Modifying Factors: The patient continues to smoke despite recommendations to stop. He has made no progress.  The wound vac was Utilized until January 9, 2017 on the left ischial region.  The patient completed 3 months therapy of antibiotic compound on 1/15/2018.  The patient sits from 5 am to 8 pm daily in a wheelchair.  Patient indicates he has not had any additional time of sitting.  On 416/2018, the patient was noted to have increased drainage and odor from his wound.  There was concern about infection and a culture was taken 4/16/2018 revealing methicillin sensitive staph aureus. Patient completed a course of Bactrim for 10 days.   Patient's wound noted to have more drainage and odor on 6/18/2018. Culture was taken to switch to topical antimicrobial to decrease colonization of wound bed. Topical antibiotic was not approved.  Repeat DNA analysis was performed of wound exudate and as of 12/10/2018.  Patient utilized a course of topical antibiotic starting 02/18/2019.  Patient recently started Dakin solution due to increased drainage and odor.  Drainage and odor has resolved as of 08/26/2019.  Patient was changed to silver dressing. Patient presented several weeks ago with recurrence drainage and odor.  Dakin's solution was restarted 9/ 9 /19.  Wound in drainage improved since starting Dakin's. Dakin's solution was stopped and he was switched to silver alginate.    Dakin solution was restarted  on 03/23/2020 due to increased drainage and no odor.  On 5/18/2020, silver alginate was restarted.         Current Assessment & Plan     Wound is stable with no sign worsening pressure.  Wound Healing is hampered by lower extremity edema.  Continue debridement to maintain active phase wound healing.  Continue Santyl.         Pressure ulcer of lower extremity, stage III    Overview     Patient has satellite wounds have progressed in turned into full-thickness wounds on the left lower extremity.  There is some necrotic tissue at the base of the wounds.  There is no sign of infection.         Current Assessment & Plan     Wound is stable with no sign worsening pressure.  Wound Healing is hampered by lower extremity edema.  Continue debridement to maintain active phase wound healing.  Continue Santyl.           Pressure ulcer of left thigh, stage 2    Overview     Patient presented with new wounds on his left thigh consistent with additional pressure wounds.  There was necrotic tissue present.  There was moderate amount of drainage.         Current Assessment & Plan     Wound is stable with no sign worsening pressure.  Wound Healing is hampered by lower extremity edema.  Continue debridement to maintain active phase wound healing.  Continue Santyl.           Pressure ulcer of left ankle, unstageable    Overview     Patient presented to wound clinic on 11/02/2020 with a new pressure ulcer to the left ankle.  The wound is unstageable.  There was black eschar and mild drainage.         Current Assessment & Plan     The patient continues with eschar to the wound.  There is no drainage.  No debridement required.          Decubitus ulcer of left knee, unstageable    Overview     Patient presented to wound clinic on 11/02/2020 with a new wound to the left medial knee.  There was thick eschar present without drainage.  The depth of injury is not known.         Current Assessment & Plan     The patient continues with eschar to  the wound.  There is no drainage.  No debridement required.          chronic ulcer of dorsum of right foot limited to breakdown of skin    Overview     Patient has developed significant edema of both legs.  He has been having edema for some time.  Edema has significantly worsened leading to numerous superficial ulcerations.         Current Assessment & Plan     Debridement of nonviable superficial tissue was performed.         Ulcer of dorsum of left foot, limited to breakdown of skin    Overview     Patient has developed significant edema of both legs.  He has been having edema for some time.  Edema has significantly worsened leading to numerous superficial ulcerations.           Current Assessment & Plan     Debridement of nonviable superficial tissue was performed.           chronic ulcer of other part of right lower leg limited to breakdown of skin    Overview     Patient has developed significant edema of both legs.  He has been having edema for some time.  Edema has significantly worsened leading to numerous superficial ulcerations.           Current Assessment & Plan     Debridement of nonviable superficial tissue was performed.           Chronic ulcer of right thigh limited to breakdown of skin    Overview     Patient has developed significant edema of both legs.  He has been having edema for some time.  Edema has significantly worsened leading to numerous superficial ulcerations.           Current Assessment & Plan     Debridement of nonviable superficial tissue was performed.              Other    Localized edema    Overview     Bilateral lower extremity         Current Assessment & Plan     Patient has marked worsening of his lower extremity edema.  This is not contributing to wound formation.  He appears to also have volume overload with likely volume of fluid within his abdomen.  I have recommended hospital admission for management of his volume overload.  He has agreed.  I have contacted his primary  physician Dr. James Taylor and she will admit the patient for management.         Pressure injury of left knee, stage 4    Overview     Patient presented with black eschar to the left knee.  Patient is unaware due to his paraplegia.  [facility] burn.  The area is dry with black eschar no drainage or sign of infection.  As of 4/6/2020, wound worsened extending further onto lower leg consistent with a pressure ulcer from sleeping with knees together.  Patient instructed to sleep with pillow between legs for offloading.  As of 05/18/2020, a portion of the wound was noted to extend into and involve fascia on the medial superior lower leg.         Current Assessment & Plan     Wound is stable with no sign worsening pressure.  Wound Healing is hampered by lower extremity edema.  Continue debridement to maintain active phase wound healing.  Continue Santyl.           Pressure injury of right knee, stage 4    Overview     Patient presents today with a new burn wound to the right knee.  There is also a satellite area.  There is no sign of infection.  Wound appears to be partial thickness.  We will implement Santyl dressing.  As of 4/6/2020, wound worsened with increased eschar consistent with a pressure ulcer from sleeping with knees together.  Patient instructed to sleep with pillow between legs for offloading.  As of 05/18/2020, the wound extends to and involves fascia.         Current Assessment & Plan     Wound is stable with no sign worsening pressure.  Wound Healing is hampered by lower extremity edema.  Continue debridement to maintain active phase wound healing.  Continue Santyl.                   History:    Past Medical History:   Diagnosis Date    Anticoagulant long-term use     Buttock wound, left, initial encounter 8/20/2018    Patient developed skin tear due to tape recognized on 08/20/2018. There is no significant drainage associated with the wound. The wound  was superficial. On 9 10/20/2018, the wound is healed    Chronic ulcer of left lower extremity, limited to breakdown of skin 6/10/2019    Patient with multiple wounds associated with picking his skin. This includes areas on his legs.     Chronic ulcer of right lower extremity with fat layer exposed 6/10/2019    Patient with multiple wounds associated with picking his skin. This includeed areas on his legs and upper abdomen.    History of DVT of lower extremity     Paraplegia     Type 2 diabetes mellitus with foot ulcer (CODE) 4/20/2020    Patient with right 2nd toe wound associated with diabetes.    Ulcer of second toe of right foot, limited to breakdown of skin 4/20/2020    Patient with ulceration of right 2nd toe present for > 1 month.  There is no associated pain, swelling or erythema. As of 05/04/2020, the wound is healed.       Past Surgical History:   Procedure Laterality Date    Buttock flap      Laparoscopic cholecystectomy         Family History   Problem Relation Age of Onset    Heart disease Father     Hypertension Father     Heart disease Brother     Hypertension Brother         reports that he has been smoking. He has a 43.00 pack-year smoking history. He has never used smokeless tobacco. He reports that he does not drink alcohol. No history on file for drug.    has a current medication list which includes the following prescription(s): aripiprazole, aspirin, docusate sodium, fish oil-omega-3 fatty acids, guaifenesin, lactulose, niacin, nicotine, nicotine polacrilex, nicotine polacrilex, nicotine polacrilex, pantoprazole, pseudoephedrine, rivaroxaban, tadalafil, venlafaxine, and vitamin d.    Allergies:  Penicillins and Tylenol [acetaminophen]    Review of Systems:  Review of Systems   Constitutional: Negative for chills, fever and weight loss.   HENT: Negative for nosebleeds.    Respiratory: Positive for shortness of breath. Negative for cough and hemoptysis.    Cardiovascular: Negative  for chest pain and orthopnea.         There were no vitals filed for this visit.      BMI:  There is no height or weight on file to calculate BMI.    Physical Exam:  Physical Exam   Constitutional: He is oriented to person, place, and time.   Morbidly obese.  Chronically ill-appearing   Pulmonary/Chest: Effort normal and breath sounds normal. No respiratory distress.   Musculoskeletal:         General: Edema (3+ bilateral lower extremity edema) present.      Comments: See wound progress note for detailed wound description.     Neurological: He is alert and oriented to person, place, and time.   Skin: Skin is warm and dry.       A1C:  No results for input(s): HGBA1C in the last 2160 hours.  BMP:  No results for input(s): GLU, NA, K, CL, CO2, BUN, CREATININE, CALCIUM, MG in the last 2160 hours.   CBC:  No results for input(s): WBC, RBC, HGB, HCT, PLT, MCV, MCH, MCHC in the last 2160 hours.  CMP:  No results for input(s): GLU, CALCIUM, ALBUMIN, PROT, NA, K, CO2, CL, BUN, ALKPHOS, ALT, AST, BILITOT in the last 2160 hours.    Invalid input(s): CREATININ  PREALBUMIN:  No results for input(s): PREALBUMIN in the last 2160 hours.  WOUND CULTURES:  No results for input(s): LABAERO in the last 2160 hours.        Plan:  See Wound Docs note for plan and follow up.        Gilmar SHERIDAN Christopher  [facility]

## 2020-11-16 NOTE — ASSESSMENT & PLAN NOTE
Patient has marked worsening of his lower extremity edema.  This is not contributing to wound formation.  He appears to also have volume overload with likely volume of fluid within his abdomen.  I have recommended hospital admission for management of his volume overload.  He has agreed.  I have contacted his primary physician Dr. James Taylor and she will admit the patient for management.

## 2022-09-26 NOTE — ASSESSMENT & PLAN NOTE
Patient left message on vm over the weekend has covid wont be getting INR    Wound has increased slough.  Continue debridement to maintain active phase wound healing.  Continue offloading.  Continue Dakin's.

## 2024-02-25 NOTE — PROGRESS NOTES
See wound care assessment documentation in chart review. Scanned under media tab.      Statement Selected

## 2025-01-23 NOTE — PROGRESS NOTES
See wound care assessment documentation in chart review. Scanned under media tab.

## 2025-04-15 NOTE — PROGRESS NOTES
[facility]  Wound Care  Progress Note    Problem List Items Addressed This Visit     Decubitus ulcer of left ischium, stage 4 - Primary    Overview     Location: Left ischial region  Duration: Since 11/22/2015  Context: Decubitus ulcer of the left ischial region  Patient developed new wound on the right ischial region noted 1/9/2017. The wound was thought to be related to tubing from the wound vac and pressure. This wound is now healed  Associated Signs and Symptoms: Patient has no pain. He is insensate   Modifying Factors: The patient continues to smoke despite recommendations to stop. He has made no progress.  The wound vac was Utilized until January 9, 2017 on the left ischial region.  The patient sits quite a bit.  He continues to be instructed again to minimize time in his chair to no more than 2 hours at a time 3 times a day.  Patient is using antibiotic compound.               See wound doc progress notes. Documents will be scanned.        Manny Vann  [facility]   yes